# Patient Record
Sex: FEMALE | Race: WHITE | NOT HISPANIC OR LATINO | Employment: FULL TIME | URBAN - METROPOLITAN AREA
[De-identification: names, ages, dates, MRNs, and addresses within clinical notes are randomized per-mention and may not be internally consistent; named-entity substitution may affect disease eponyms.]

---

## 2017-04-27 ENCOUNTER — TELEPHONE (OUTPATIENT)
Dept: OBGYN | Facility: CLINIC | Age: 32
End: 2017-04-27

## 2017-04-27 NOTE — TELEPHONE ENCOUNTER
First pregnancy-estimates 4 weeks along-positive home pregnancy test. Sending encounter to clinic per protocol to establish new prenatal. Please contact at [phone]. Okay to leave detailed message.    Central Scheduling  Suly FAIRCHILD

## 2017-05-09 DIAGNOSIS — O36.80X0 ENCOUNTER TO DETERMINE FETAL VIABILITY OF PREGNANCY, NOT APPLICABLE OR UNSPECIFIED FETUS: Primary | ICD-10-CM

## 2017-05-18 ENCOUNTER — TRANSFERRED RECORDS (OUTPATIENT)
Dept: HEALTH INFORMATION MANAGEMENT | Facility: CLINIC | Age: 32
End: 2017-05-18

## 2017-05-18 ENCOUNTER — RADIANT APPOINTMENT (OUTPATIENT)
Dept: ULTRASOUND IMAGING | Facility: CLINIC | Age: 32
End: 2017-05-18
Payer: COMMERCIAL

## 2017-05-18 ENCOUNTER — PRENATAL OFFICE VISIT (OUTPATIENT)
Dept: OBGYN | Facility: CLINIC | Age: 32
End: 2017-05-18
Payer: COMMERCIAL

## 2017-05-18 VITALS
BODY MASS INDEX: 29.89 KG/M2 | SYSTOLIC BLOOD PRESSURE: 117 MMHG | HEART RATE: 90 BPM | WEIGHT: 197.2 LBS | DIASTOLIC BLOOD PRESSURE: 81 MMHG | HEIGHT: 68 IN

## 2017-05-18 DIAGNOSIS — O36.80X0 ENCOUNTER TO DETERMINE FETAL VIABILITY OF PREGNANCY, NOT APPLICABLE OR UNSPECIFIED FETUS: ICD-10-CM

## 2017-05-18 DIAGNOSIS — Z34.01 ENCOUNTER FOR SUPERVISION OF NORMAL FIRST PREGNANCY IN FIRST TRIMESTER: Primary | ICD-10-CM

## 2017-05-18 PROBLEM — Z34.00 SUPERVISION OF NORMAL IUP (INTRAUTERINE PREGNANCY) IN PRIMIGRAVIDA: Status: ACTIVE | Noted: 2017-05-18

## 2017-05-18 LAB
ABO + RH BLD: NORMAL
ABO + RH BLD: NORMAL
ALBUMIN UR-MCNC: NEGATIVE MG/DL
APPEARANCE UR: CLEAR
BACTERIA #/AREA URNS HPF: ABNORMAL /HPF
BASOPHILS # BLD AUTO: 0 10E9/L (ref 0–0.2)
BASOPHILS NFR BLD AUTO: 0.2 %
BILIRUB UR QL STRIP: NEGATIVE
BLD GP AB SCN SERPL QL: NORMAL
BLOOD BANK CMNT PATIENT-IMP: NORMAL
COLOR UR AUTO: YELLOW
DIFFERENTIAL METHOD BLD: NORMAL
EOSINOPHIL # BLD AUTO: 0.1 10E9/L (ref 0–0.7)
EOSINOPHIL NFR BLD AUTO: 1.7 %
ERYTHROCYTE [DISTWIDTH] IN BLOOD BY AUTOMATED COUNT: 11.1 % (ref 10–15)
GLUCOSE UR STRIP-MCNC: NEGATIVE MG/DL
HCT VFR BLD AUTO: 37.6 % (ref 35–47)
HGB BLD-MCNC: 13 G/DL (ref 11.7–15.7)
HGB UR QL STRIP: ABNORMAL
KETONES UR STRIP-MCNC: NEGATIVE MG/DL
LEUKOCYTE ESTERASE UR QL STRIP: ABNORMAL
LYMPHOCYTES # BLD AUTO: 1.9 10E9/L (ref 0.8–5.3)
LYMPHOCYTES NFR BLD AUTO: 22.3 %
MCH RBC QN AUTO: 31.5 PG (ref 26.5–33)
MCHC RBC AUTO-ENTMCNC: 34.6 G/DL (ref 31.5–36.5)
MCV RBC AUTO: 91 FL (ref 78–100)
MONOCYTES # BLD AUTO: 0.5 10E9/L (ref 0–1.3)
MONOCYTES NFR BLD AUTO: 6.3 %
NEUTROPHILS # BLD AUTO: 5.8 10E9/L (ref 1.6–8.3)
NEUTROPHILS NFR BLD AUTO: 69.5 %
NITRATE UR QL: NEGATIVE
NON-SQ EPI CELLS #/AREA URNS LPF: ABNORMAL /LPF
PH UR STRIP: 7 PH (ref 5–7)
PLATELET # BLD AUTO: 187 10E9/L (ref 150–450)
RBC # BLD AUTO: 4.13 10E12/L (ref 3.8–5.2)
RBC #/AREA URNS AUTO: ABNORMAL /HPF (ref 0–2)
RUBELLA ABY IGG: NORMAL
SP GR UR STRIP: 1.01 (ref 1–1.03)
SPECIMEN EXP DATE BLD: NORMAL
URN SPEC COLLECT METH UR: ABNORMAL
UROBILINOGEN UR STRIP-ACNC: 0.2 EU/DL (ref 0.2–1)
WBC # BLD AUTO: 8.3 10E9/L (ref 4–11)
WBC #/AREA URNS AUTO: ABNORMAL /HPF (ref 0–2)

## 2017-05-18 PROCEDURE — 86850 RBC ANTIBODY SCREEN: CPT | Performed by: OBSTETRICS & GYNECOLOGY

## 2017-05-18 PROCEDURE — 87491 CHLMYD TRACH DNA AMP PROBE: CPT | Performed by: OBSTETRICS & GYNECOLOGY

## 2017-05-18 PROCEDURE — 99207 ZZC FIRST OB VISIT: CPT | Performed by: OBSTETRICS & GYNECOLOGY

## 2017-05-18 PROCEDURE — 86900 BLOOD TYPING SEROLOGIC ABO: CPT | Performed by: OBSTETRICS & GYNECOLOGY

## 2017-05-18 PROCEDURE — 76801 OB US < 14 WKS SINGLE FETUS: CPT | Performed by: OBSTETRICS & GYNECOLOGY

## 2017-05-18 PROCEDURE — 87591 N.GONORRHOEAE DNA AMP PROB: CPT | Performed by: OBSTETRICS & GYNECOLOGY

## 2017-05-18 PROCEDURE — 86780 TREPONEMA PALLIDUM: CPT | Performed by: OBSTETRICS & GYNECOLOGY

## 2017-05-18 PROCEDURE — 87086 URINE CULTURE/COLONY COUNT: CPT | Performed by: OBSTETRICS & GYNECOLOGY

## 2017-05-18 PROCEDURE — 85025 COMPLETE CBC W/AUTO DIFF WBC: CPT | Performed by: OBSTETRICS & GYNECOLOGY

## 2017-05-18 PROCEDURE — 87389 HIV-1 AG W/HIV-1&-2 AB AG IA: CPT | Performed by: OBSTETRICS & GYNECOLOGY

## 2017-05-18 PROCEDURE — 40000791 ZZHCL STATISTIC VERIFI PRENATAL TRISOMY 21,18,13: Mod: 90 | Performed by: OBSTETRICS & GYNECOLOGY

## 2017-05-18 PROCEDURE — 81001 URINALYSIS AUTO W/SCOPE: CPT | Performed by: OBSTETRICS & GYNECOLOGY

## 2017-05-18 PROCEDURE — 87340 HEPATITIS B SURFACE AG IA: CPT | Performed by: OBSTETRICS & GYNECOLOGY

## 2017-05-18 PROCEDURE — 86762 RUBELLA ANTIBODY: CPT | Performed by: OBSTETRICS & GYNECOLOGY

## 2017-05-18 PROCEDURE — 36415 COLL VENOUS BLD VENIPUNCTURE: CPT | Performed by: OBSTETRICS & GYNECOLOGY

## 2017-05-18 PROCEDURE — 99000 SPECIMEN HANDLING OFFICE-LAB: CPT | Performed by: OBSTETRICS & GYNECOLOGY

## 2017-05-18 PROCEDURE — 86901 BLOOD TYPING SEROLOGIC RH(D): CPT | Performed by: OBSTETRICS & GYNECOLOGY

## 2017-05-18 ASSESSMENT — ANXIETY QUESTIONNAIRES
6. BECOMING EASILY ANNOYED OR IRRITABLE: SEVERAL DAYS
1. FEELING NERVOUS, ANXIOUS, OR ON EDGE: SEVERAL DAYS
3. WORRYING TOO MUCH ABOUT DIFFERENT THINGS: NOT AT ALL
5. BEING SO RESTLESS THAT IT IS HARD TO SIT STILL: NOT AT ALL
IF YOU CHECKED OFF ANY PROBLEMS ON THIS QUESTIONNAIRE, HOW DIFFICULT HAVE THESE PROBLEMS MADE IT FOR YOU TO DO YOUR WORK, TAKE CARE OF THINGS AT HOME, OR GET ALONG WITH OTHER PEOPLE: NOT DIFFICULT AT ALL
7. FEELING AFRAID AS IF SOMETHING AWFUL MIGHT HAPPEN: NOT AT ALL
2. NOT BEING ABLE TO STOP OR CONTROL WORRYING: NOT AT ALL
GAD7 TOTAL SCORE: 2

## 2017-05-18 ASSESSMENT — PATIENT HEALTH QUESTIONNAIRE - PHQ9: 5. POOR APPETITE OR OVEREATING: NOT AT ALL

## 2017-05-18 NOTE — PROGRESS NOTES
This is a 31 year old female patient,   who presents for her first obstetrical visit.    Patient's last menstrual period was 2017..  This gives her an EDC of Dec 14, 2017 .  She is 10w0d weeks.  Her cycles are regular.  Her last menstrual period was normal.  She has had an ultrasound on 17 which showed IUP @ 11w0d. .  Since her LMP, she has experienced  nausea, fatigue, headache, constipation and Hair, Breast tenderness, Diarrhea, Heartburn).  She denies emesis, abdominal pain, loss of appetite, vaginal discharge, dysuria, pelvic pain, urinary urgency, lightheadedness, urinary frequency, vaginal bleeding and hemorrhoids.    Was having 5-6 drinks per day up until 3wks ago. Several years like this. Has thought that she has a drinking problem. However found it easy to stop.   Was smoking about 1/4-1/2 ppd. Stopped about 3.5wks ago. MJ occ.   Also used plan B x 2 3/26 and 4/10 as pregnancy was unplanned.    Has a cat, Qasim. Questions about toxo.    Regular periods. No contraception. Stopped OCPs 5yrs ago, was having more migraines.     Depression/anxiety in past. Moods up and down.      No history abnormal paps. Last     No travel to tropical places in last year+        Past History:  Her past medical history   Past Medical History:   Diagnosis Date     Anxiety      Depression     Was on zoloft in past for 9mo. Self stopped     Excessive drinking alcohol     Monitor through pregnancy.      Migraine headache 9/3/2007    maxalt helps    .      This is her first pregnancy    Since her last LMP she denies use of alcohol, tobacco and street drugs.    HISTORY:  Obstetric History       T0      TAB0   SAB0   E0   M0   L0       # Outcome Date GA Lbr Saturnino/2nd Weight Sex Delivery Anes PTL Lv   1 Current                 Past Medical History:   Diagnosis Date     Anxiety      Depression     Was on zoloft in past for 9mo. Self stopped     Excessive drinking alcohol     Monitor through pregnancy.       Migraine headache 9/3/2007    maxalt helps      Past Surgical History:   Procedure Laterality Date     ENT SURGERY      wisdom teeth 2015     ORTHOPEDIC SURGERY      hip surgery as a baby, yuniel hip     Family History   Problem Relation Age of Onset     Cardiovascular Maternal Grandfather      Hypertension Maternal Grandfather      Lipids Maternal Grandfather      Cardiovascular Paternal Grandmother      Alcohol/Drug Paternal Grandmother      OSTEOPOROSIS Paternal Grandmother      DIABETES Mother      pre diabetic     Hypertension Mother      Allergies Mother      Gynecology Mother      DIABETES Maternal Grandmother      Hypertension Maternal Grandmother      Social History     Social History     Marital status: Single     Spouse name: N/A     Number of children: N/A     Years of education: N/A     Social History Main Topics     Smoking status: Former Smoker     Smokeless tobacco: Never Used     Alcohol use No      Comment: daily 1-2 glasses wine     Drug use: No     Sexual activity: Yes     Partners: Male     Birth control/ protection: Condom     Other Topics Concern     Parent/Sibling W/ Cabg, Mi Or Angioplasty Before 65f 55m? No     Social History Narrative     Current Outpatient Prescriptions   Medication Sig     rizatriptan (MAXALT) 10 MG tablet Take 1 tablet (10 mg) by mouth at onset of headache (Patient not taking: Reported on 5/18/2017)     melatonin 3 MG tablet Take 1 tablet by mouth nightly as needed Reported on 5/18/2017     Multiple Vitamin (MULTI-VITAMIN DAILY PO) Take 1 tablet by mouth daily Reported on 5/18/2017     calcium-vitamin D (CALTRATE 600+D) 600-400 MG-UNIT per tablet Take 1 tablet by mouth 2 times daily Reported on 5/18/2017     cetirizine (ZYRTEC ALLERGY) 10 MG tablet Take 1 tablet by mouth daily Reported on 5/18/2017     ibuprofen (ADVIL,MOTRIN) 800 MG tablet Take 1 tablet (800 mg) by mouth every 8 hours as needed for pain (Patient not taking: Reported on 5/18/2017)     No current  "facility-administered medications for this visit.      Allergies   Allergen Reactions     Nkda [No Known Drug Allergies]        Past medical, surgical, social and family history were reviewed and updated in EPIC.    ROS:   12 point review of systems negative other than symptoms noted below.  Constitutional: Fatigue  Breast: Tenderness  Gastrointestinal: Constipation, Diarrhea, Heartburn and Nausea  Neurologic: Headaches  Psychiatric: Hair    EXAM:  /81  Pulse 90  Ht 5' 8\" (1.727 m)  Wt 197 lb 3.2 oz (89.4 kg)  LMP 03/09/2017  BMI 29.98 kg/m2   BMI: Body mass index is 29.98 kg/(m^2).    EXAM:  Constitutional:  Appearance: Well nourished, well developed alert, in no acute distress.  Neck:   Lymph Nodes:  No lymphadenopathy present.    Thyroid:  Gland size normal, nontender, no nodules or masses present  on palpation.  Chest:  Respiratory Effort:  Breathing unlabored.  Cardiovascular:  Heart Auscultation:  Regular rate, normal rhythm, no murmurs    present.  Breasts: Deferred.    Axillary Lymph Nodes:  No lymphadenopathy present.  Gastrointestinal:  Abdominal Examination:  Abdomen nontender to palpation, tone  normal without rigidity or guarding, no masses present, umbilicus without  Lesions.    Liver and speen:  No hepatomegaly present, liver nontender to  palpation.    Hernias:  No hernias present.  Lymphatic: Lymph Nodes:  No other lymphadenopathy present.  Skin:  General Inspection:  No rashes present, no lesions present, no areas of  discoloration.    Genitalia and Groin:  No rashes present, no lesions present, no areas of  discoloration, no masses present.  Neurologic/Psychiatric:    Mental Status:  Oriented X3.    Pelvic Exam:  External Genitalia:     Normal appearance for age, no discharge present, no tenderness present, no inflammatory lesions present, color normal  Vagina:     Normal vaginal vault without central or paravaginal defects, no discharge present, no inflammatory lesions present, no " masses present  Bladder:     Nontender to palpation  Urethra:   Urethral Body:  Urethra palpation normal, urethra structural support normal   Urethral Meatus:  No erythema or lesions present  Cervix:     Appearance healthy, no lesions present, nontender to palpation, no bleeding present  Uterus:     Nontender to palpation, no masses present, position anteflexed, mobility: normal  Adnexa:     No adnexal tenderness present, no adnexal masses present  Perineum:     Perineum within normal limits, no evidence of trauma, no rashes or skin lesions present  Anus:     Anus within normal limits, no hemorrhoids present  Inguinal Lymph Nodes:     No lymphadenopathy present  Pubic Hair:     Normal pubic hair distribution for age  Genitalia and Groin:     No rashes present, no lesions present, no areas of discoloration, no masses present    ASSESSMENT:      ICD-10-CM    1. Encounter for supervision of normal first pregnancy in first trimester Z34.01 UA with Microscopic     ABO/Rh type and screen     Anti Treponema     CBC with platelets differential     Hepatitis B surface antigen     HIV Antigen Antibody Combo     Urine Culture Aerobic Bacterial     Rubella Antibody IgG Quantitative     Chlamydia trachomatis PCR     Neisseria gonorrhoeae PCR     Prenatal trisomy 21,18,13       PLAN/PATIENT INSTRUCTIONS:      -UTD (2016) cervical cancer screening.  -NOB labs today, orders reviewed  -Desires aneuploidy screening. Discussed options for diagnostic and screening tests, benefits and limitations of each.   -Discussed risks of drinking/smoking/meds use such as plan B in early parts of pregnancy. Did stop these 2.5-3.5wks ago which is potentially beneficial.   Discussed how Plan B works to prevent ovulation.  US shows appropriately grown, viable IUP. WIll cont to monitor.   -Discussed her drinking, has stopped. Will monitor. May need to address as pregnancy goes on, potential need for intervention .  -monitor anxiety.  -reviewed  migraines in preg, OTC therapies.  -miscarriage precautions reviewed            Stephanie Mckenzies, DO

## 2017-05-18 NOTE — NURSING NOTE
Obstetrical Risk History    Patient presents for new OB labs and teaching.      1. Please indicate any condition you have or have had in the past:  Migraine  2. Do you smoke?  No  If yes, how many packs/day?   3. Do you drink alcoholic beverages? No  If yes, how often?  What type?   4. List any medications taken since your last period: None  5. List any recreational drugs (cocaine, marijuana, etc. used since your last period:None    6. List any chemical or radiation exposure that you've encountered: None  7. Are you on a restricted diet? No  If yes, please describe:  Do you have any Quaker objections to any form of treatment? No    GENETIC SCREENING    These questions apply to you, the baby's father, or anyone in either family with:    1. Patient's age 35 or greater at delivery? No  2. Wolof, Danish, Mediterranean ancestry? Yes, FOB maternal side Wolof  3. Neural Tube Defect (Meningomyelocele, Spina Bifida, or Anencephaly)? No  4. Church, Maltese Reeves or history of Bulmaro-Sachs disease? No  5. Down's Syndrome?   No  6. Hemophilia or clotting disorder? No  7. Muscular Dystrophy? No  8. Cystic Fibrosis? No  9. Tamra's Chorea? No  10. Mental Retardation? No  11. 3 or more miscarriages or a stillborn? No  12. Other inherited disease or chromosomal disorder? No  13. Have you or the baby's father had a child born with a birth defect? No  14. Did you or the baby's father have a birth defect yourselves? Yes Pt born with congential Hip    Do you have any other concerns about birth defects? Yes Pt is concerned with damage from drinking and smoking and plan B for first 6 weeks

## 2017-05-18 NOTE — MR AVS SNAPSHOT
After Visit Summary   5/18/2017    Maddy Anglin    MRN: [id number]           Patient Information     Date Of Birth          1985        Visit Information        Provider Department      5/18/2017 10:40 AM Stephanie Hanna DO; [facility] [place]        Today's Diagnoses     Encounter for supervision of normal first pregnancy in first trimester    -  1       Follow-ups after your visit        Your next 10 appointments already scheduled     Jun 19, 2017  3:20 PM CDT   ESTABLISHED PRENATAL with Stephanie Hanna DO   [facility] [place] ([facility])    [address] [place] [address] [address]  [facility] [id number] [phone]              Who to contact     If you have questions or need follow up information about today's clinic visit or your schedule please contact [facility] directly at [phone].  Normal or non-critical lab and imaging results will be communicated to you by MyChart, letter or phone within 4 business days after the clinic has received the results. If you do not hear from us within 7 days, please contact the clinic through Feedskyhart or phone. If you have a critical or abnormal lab result, we will notify you by phone as soon as possible.  Submit refill requests through Crush on original products or call your pharmacy and they will forward the refill request to us. Please allow 3 business days for your refill to be completed.          Additional Information About Your Visit        MyChart Information     Crush on original products gives you secure access to your electronic health record. If you see a primary care provider, you can also send messages to your care team and make appointments. If you have questions, please call your primary care clinic.  If you do not have a primary care provider, please call [phone] and they will assist you.        Care EveryWhere ID     This is your Care EveryWhere ID. This could be used by other  "organizations to access your [place] medical records  [id number]        Your Vitals Were     Pulse Height Last Period BMI (Body Mass Index)          90 5' 8\" (1.727 m) 03/09/2017 29.98 kg/m2         Blood Pressure from Last 3 Encounters:   05/18/17 117/81   11/07/16 100/70   10/18/16 120/78    Weight from Last 3 Encounters:   05/18/17 197 lb 3.2 oz (89.4 kg)   11/07/16 193 lb (87.5 kg)   10/18/16 193 lb (87.5 kg)              We Performed the Following     ABO/Rh type and screen     Anti Treponema     CBC with platelets differential     Chlamydia trachomatis PCR     Hepatitis B surface antigen     HIV Antigen Antibody Combo     Neisseria gonorrhoeae PCR     Prenatal trisomy 21,18,13     Rubella Antibody IgG Quantitative     UA with Microscopic     Urine Culture Aerobic Bacterial        Primary Care Provider Office Phone # Fax #    Luna Jiang [phone]       [facility] [address] [place] [address]        Thank you!     Thank you for choosing [facility] [place]  for your care. Our goal is always to provide you with excellent care. Hearing back from our patients is one way we can continue to improve our services. Please take a few minutes to complete the written survey that you may receive in the mail after your visit with us. Thank you!             Your Updated Medication List - Protect others around you: Learn how to safely use, store and throw away your medicines at www.disposemymeds.org.          This list is accurate as of: 5/18/17 12:08 PM.  Always use your most recent med list.                   Brand Name Dispense Instructions for use    CALTRATE 600+D 600-400 MG-UNIT per tablet   Generic drug:  calcium-vitamin D      Take 1 tablet by mouth 2 times daily Reported on 5/18/2017       ibuprofen 800 MG tablet    ADVIL/MOTRIN    30 tablet    Take 1 tablet (800 mg) by mouth every 8 hours as needed for pain       melatonin 3 MG tablet      Take 1 " tablet by mouth nightly as needed Reported on 5/18/2017       MULTI-VITAMIN DAILY PO      Take 1 tablet by mouth daily Reported on 5/18/2017       rizatriptan 10 MG tablet    MAXALT    20 tablet    Take 1 tablet (10 mg) by mouth at onset of headache       ZYRTEC ALLERGY 10 MG tablet   Generic drug:  cetirizine      Take 1 tablet by mouth daily Reported on 5/18/2017

## 2017-05-19 LAB
BACTERIA SPEC CULT: NORMAL
C TRACH DNA SPEC QL NAA+PROBE: NORMAL
HBV SURFACE AG SERPL QL IA: NONREACTIVE
HIV 1+2 AB+HIV1 P24 AG SERPL QL IA: NORMAL
Lab: NORMAL
MICRO REPORT STATUS: NORMAL
N GONORRHOEA DNA SPEC QL NAA+PROBE: NORMAL
RUBV IGG SERPL IA-ACNC: 31 IU/ML
SPECIMEN SOURCE: NORMAL
T PALLIDUM IGG+IGM SER QL: NEGATIVE

## 2017-05-19 ASSESSMENT — ANXIETY QUESTIONNAIRES: GAD7 TOTAL SCORE: 2

## 2017-05-19 ASSESSMENT — PATIENT HEALTH QUESTIONNAIRE - PHQ9: SUM OF ALL RESPONSES TO PHQ QUESTIONS 1-9: 5

## 2017-05-26 ENCOUNTER — TELEPHONE (OUTPATIENT)
Dept: NURSING | Facility: CLINIC | Age: 32
End: 2017-05-26

## 2017-05-26 NOTE — TELEPHONE ENCOUNTER
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 Pt did want to know the gender. Informed a boy.

## 2017-06-23 ENCOUNTER — PRENATAL OFFICE VISIT (OUTPATIENT)
Dept: OBGYN | Facility: CLINIC | Age: 32
End: 2017-06-23
Payer: COMMERCIAL

## 2017-06-23 VITALS — DIASTOLIC BLOOD PRESSURE: 70 MMHG | BODY MASS INDEX: 30.11 KG/M2 | WEIGHT: 198 LBS | SYSTOLIC BLOOD PRESSURE: 112 MMHG

## 2017-06-23 DIAGNOSIS — Z34.02 ENCOUNTER FOR SUPERVISION OF NORMAL FIRST PREGNANCY IN SECOND TRIMESTER: ICD-10-CM

## 2017-06-23 PROCEDURE — 99207 ZZC PRENATAL VISIT: CPT | Performed by: OBSTETRICS & GYNECOLOGY

## 2017-06-23 NOTE — MR AVS SNAPSHOT
After Visit Summary   6/23/2017    Maddy Anglin    MRN: [id number]           Patient Information     Date Of Birth          1985        Visit Information        Provider Department      6/23/2017 2:10 PM Stephanie Hanna,  [facility] [place]        Today's Diagnoses     Encounter for supervision of normal first pregnancy in second trimester           Follow-ups after your visit        Follow-up notes from your care team     Return in about 4 weeks (around 7/21/2017).      Your next 10 appointments already scheduled     Jul 24, 2017  3:00 PM CDT   US OB > 14 WEEKS COMPLETE SINGLE with WEUS1   [facility] [place] ([facility] [place])    [address]  [place] [address] [phone]           Please bring a list of your medicines (including vitamins, minerals and over-the-counter drugs). Also, tell your doctor about any allergies you may have. Wear comfortable clothes and leave your valuables at home.  If you re less than 20 weeks drink four 8-ounce glasses of fluid an hour before your exam. If you need to empty your bladder before your exam, try to release only a little urine. Then, drink another glass of fluid.  You may have up to two family members in the exam room. If you bring a small child, an adult must be there to care for him or her.  Please call the Imaging Department at your exam site with any questions.            Jul 24, 2017  3:50 PM CDT   ESTABLISHED PRENATAL with Stephanie Hanna,    [facility] [place] ([facility] [place])    [address]  [facility] [address] [phone]              Future tests that were ordered for you today     Open Future Orders        Priority Expected Expires Ordered    US OB > 14 Weeks Complete Single Routine  6/24/2018 6/23/2017            Who to contact     If you have questions or need follow up information about today's  clinic visit or your schedule please contact [facility] [place] directly at [phone].  Normal or non-critical lab and imaging results will be communicated to you by MyChart, letter or phone within 4 business days after the clinic has received the results. If you do not hear from us within 7 days, please contact the clinic through Heaphart or phone. If you have a critical or abnormal lab result, we will notify you by phone as soon as possible.  Submit refill requests through Cloze or call your pharmacy and they will forward the refill request to us. Please allow 3 business days for your refill to be completed.          Additional Information About Your Visit        HeapharY-Clients Information     Cloze gives you secure access to your electronic health record. If you see a primary care provider, you can also send messages to your care team and make appointments. If you have questions, please call your primary care clinic.  If you do not have a primary care provider, please call [phone] and they will assist you.        Care EveryWhere ID     This is your Care EveryWhere ID. This could be used by other organizations to access your [place] medical records  [id number]        Your Vitals Were     Last Period BMI (Body Mass Index)                03/09/2017 30.11 kg/m2           Blood Pressure from Last 3 Encounters:   06/23/17 112/70   05/18/17 117/81   11/07/16 100/70    Weight from Last 3 Encounters:   06/23/17 198 lb (89.8 kg)   05/18/17 197 lb 3.2 oz (89.4 kg)   11/07/16 193 lb (87.5 kg)               Primary Care Provider Office Phone # Fax #    Luna Jiang [phone]       [facility] [address]   [facility] [address]        Equal Access to Services     PATEL FALLON : Trey Alcocer, samson maldonado, taty fitzgerald, bhavin truong. So [facility] [phone].    ATENCIÓN: Si gregorio forrester, sheri read corado  disposición servicios gratuitos de asistencia lingüística. Harriet paiz [phone].    We comply with applicable federal civil rights laws and [place] laws. We do not discriminate on the basis of race, color, national origin, age, disability sex, sexual orientation or gender identity.            Thank you!     Thank you for choosing [facility] [place]  for your care. Our goal is always to provide you with excellent care. Hearing back from our patients is one way we can continue to improve our services. Please take a few minutes to complete the written survey that you may receive in the mail after your visit with us. Thank you!             Your Updated Medication List - Protect others around you: Learn how to safely use, store and throw away your medicines at www.disposemymeds.org.          This list is accurate as of: 6/23/17  2:53 PM.  Always use your most recent med list.                   Brand Name Dispense Instructions for use Diagnosis    PRENATAL VITAMIN PO       Encounter for supervision of normal first pregnancy in second trimester

## 2017-06-23 NOTE — PROGRESS NOTES
No loss of fluid/vaginal bleeding/regular contractions. No FM  Occ HA's.   -Innatal wnl. BOY!  - Labor precautions. F/U 4wk.    Stephanie Banerjee Masters, DO

## 2017-07-12 LAB — NON INVASIVE PRENATAL TEST CELL FREE DNA: NORMAL

## 2017-07-28 ENCOUNTER — RADIANT APPOINTMENT (OUTPATIENT)
Dept: ULTRASOUND IMAGING | Facility: CLINIC | Age: 32
End: 2017-07-28
Attending: OBSTETRICS & GYNECOLOGY
Payer: COMMERCIAL

## 2017-07-28 ENCOUNTER — PRENATAL OFFICE VISIT (OUTPATIENT)
Dept: OBGYN | Facility: CLINIC | Age: 32
End: 2017-07-28
Attending: OBSTETRICS & GYNECOLOGY
Payer: COMMERCIAL

## 2017-07-28 VITALS — SYSTOLIC BLOOD PRESSURE: 119 MMHG | BODY MASS INDEX: 30.56 KG/M2 | WEIGHT: 201 LBS | DIASTOLIC BLOOD PRESSURE: 70 MMHG

## 2017-07-28 DIAGNOSIS — Z3A.20 20 WEEKS GESTATION OF PREGNANCY: ICD-10-CM

## 2017-07-28 DIAGNOSIS — Z34.02 ENCOUNTER FOR SUPERVISION OF NORMAL FIRST PREGNANCY IN SECOND TRIMESTER: ICD-10-CM

## 2017-07-28 DIAGNOSIS — O35.EXX0 PYELECTASIS OF FETUS ON PRENATAL ULTRASOUND: Primary | ICD-10-CM

## 2017-07-28 PROCEDURE — 99207 ZZC PRENATAL VISIT: CPT | Performed by: OBSTETRICS & GYNECOLOGY

## 2017-07-28 PROCEDURE — 76805 OB US >/= 14 WKS SNGL FETUS: CPT | Performed by: OBSTETRICS & GYNECOLOGY

## 2017-07-28 NOTE — PROGRESS NOTES
No loss of fluid/vaginal bleeding/regular contractions. No FM  -Reviewed US. B/L pyelectasis. Innatal negative. Suspect will resolve/not be clinically significant and not likely associated with aneuploidy. Repeat US at 28wk  - Labor precautions. F/U 4wk.     Stephanie Banerjee Masters, DO

## 2017-07-28 NOTE — MR AVS SNAPSHOT
After Visit Summary   7/28/2017    Maddy Anglin    MRN: [id number]           Patient Information     Date Of Birth          1985        Visit Information        Provider Department      7/28/2017 3:00 PM Stephanie Hanna DO [facility]        Today's Diagnoses     Pyelectasis of fetus on prenatal ultrasound    -  1    20 weeks gestation of pregnancy           Follow-ups after your visit        Your next 10 appointments already scheduled     Aug 25, 2017  2:00 PM CDT   ESTABLISHED PRENATAL with Stephanie Hanna DO   [facility] ([facility]    [address] [place] [id number] [phone]              Who to contact     If you have questions or need follow up information about today's clinic visit or your schedule please contact [facility] directly at [phone].  Normal or non-critical lab and imaging results will be communicated to you by Xtiumhart, letter or phone within 4 business days after the clinic has received the results. If you do not hear from us within 7 days, please contact the clinic through Xtiumhart or phone. If you have a critical or abnormal lab result, we will notify you by phone as soon as possible.  Submit refill requests through Web Geo Services or call your pharmacy and they will forward the refill request to us. Please allow 3 business days for your refill to be completed.          Additional Information About Your Visit        MyChart Information     Web Geo Services gives you secure access to your electronic health record. If you see a primary care provider, you can also send messages to your care team and make appointments. If you have questions, please call your primary care clinic.  If you do not have a primary care provider, please call [phone] and they will assist you.        Care EveryWhere ID     This is your Care EveryWhere ID. This could be used by other  organizations to access your [place] medical records  [id number]        Your Vitals Were     Last Period BMI (Body Mass Index)                03/09/2017 30.56 kg/m2           Blood Pressure from Last 3 Encounters:   07/28/17 119/70   06/23/17 112/70   05/18/17 117/81    Weight from Last 3 Encounters:   07/28/17 201 lb (91.2 kg)   06/23/17 198 lb (89.8 kg)   05/18/17 197 lb 3.2 oz (89.4 kg)              Today, you had the following     No orders found for display       Primary Care Provider Office Phone # Fax #    Luna Jiang [phone]       [facility] [address] [address]        Equal Access to Services     PATEL [place] : Hadii aad ku hadasho Soana, waaxda luqadaha, qaybta kaalmada adeegyada, bhavin reese . So [facility] [phone].    ATENCIÓN: Si habla español, tiene a corado disposición servicios gratuitos de asistencia lingüística. Llame al [phone].    We comply with applicable federal civil rights laws and [place] laws. We do not discriminate on the basis of race, color, national origin, age, disability sex, sexual orientation or gender identity.            Thank you!     Thank you for choosing [facility] [place]  for your care. Our goal is always to provide you with excellent care. Hearing back from our patients is one way we can continue to improve our services. Please take a few minutes to complete the written survey that you may receive in the mail after your visit with us. Thank you!             Your Updated Medication List - Protect others around you: Learn how to safely use, store and throw away your medicines at www.disposemymeds.org.          This list is accurate as of: 7/28/17  3:29 PM.  Always use your most recent med list.                   Brand Name Dispense Instructions for use Diagnosis    PRENATAL VITAMIN PO       Encounter for supervision of normal first pregnancy in second trimester

## 2017-08-25 ENCOUNTER — PRENATAL OFFICE VISIT (OUTPATIENT)
Dept: OBGYN | Facility: CLINIC | Age: 32
End: 2017-08-25
Payer: COMMERCIAL

## 2017-08-25 VITALS — WEIGHT: 204.8 LBS | BODY MASS INDEX: 31.14 KG/M2 | DIASTOLIC BLOOD PRESSURE: 70 MMHG | SYSTOLIC BLOOD PRESSURE: 114 MMHG

## 2017-08-25 DIAGNOSIS — Z34.02 ENCOUNTER FOR SUPERVISION OF NORMAL FIRST PREGNANCY IN SECOND TRIMESTER: ICD-10-CM

## 2017-08-25 PROCEDURE — 99207 ZZC PRENATAL VISIT: CPT | Performed by: OBSTETRICS & GYNECOLOGY

## 2017-08-25 NOTE — PROGRESS NOTES
No loss of fluid/vaginal bleeding/regular contractions. + FM  -Pyelectasis, 5mm b/l. Repeat at 32wk  - Labor precautions. F/U 4wk    Stephanie Banerjee Masters, DO

## 2017-08-25 NOTE — MR AVS SNAPSHOT
After Visit Summary   8/25/2017    Maddy Anglin    MRN: [id number]           Patient Information     Date Of Birth          1985        Visit Information        Provider Department      8/25/2017 2:00 PM Stephanie Hanna DO [facility] [place]        Today's Diagnoses     Encounter for supervision of normal first pregnancy in second trimester           Follow-ups after your visit        Follow-up notes from your care team     Return in about 4 weeks (around 9/22/2017).      Your next 10 appointments already scheduled     Sep 22, 2017  2:30 PM CDT   Glucose Tolerance Test with WE LAB   [facility] [place] ([facility] [place])    [address]  [address]  [place] [address]   [phone]            Sep 22, 2017  2:40 PM CDT   ESTABLISHED PRENATAL with Stephanie Hanna DO   [facility] [place] ([facility] [place])    [address]  [address]  [place] [address]   [phone]              Who to contact     If you have questions or need follow up information about today's clinic visit or your schedule please contact [facility] [place] directly at [phone].  Normal or non-critical lab and imaging results will be communicated to you by MyChart, letter or phone within 4 business days after the clinic has received the results. If you do not hear from us within 7 days, please contact the clinic through MyChart or phone. If you have a critical or abnormal lab result, we will notify you by phone as soon as possible.  Submit refill requests through OpDemand or call your pharmacy and they will forward the refill request to us. Please allow 3 business days for your refill to be completed.          Additional Information About Your Visit        MyChart Information     OpDemand gives you secure access to your electronic health record. If you see a primary care provider, you can also send messages  to your care team and make appointments. If you have questions, please call your primary care clinic.  If you do not have a primary care provider, please call [phone] and they will assist you.        Care EveryWhere ID     This is your Care EveryWhere ID. This could be used by other organizations to access your [place] medical records  [id number]        Your Vitals Were     Last Period BMI (Body Mass Index)                03/09/2017 31.14 kg/m2           Blood Pressure from Last 3 Encounters:   08/25/17 114/70   07/28/17 119/70   06/23/17 112/70    Weight from Last 3 Encounters:   08/25/17 204 lb 12.8 oz (92.9 kg)   07/28/17 201 lb (91.2 kg)   06/23/17 198 lb (89.8 kg)              Today, you had the following     No orders found for display       Primary Care Provider Office Phone # Fax #    Luna Jiang [phone]       [address] [address]        Equal Access to Services     [facility]: Hadii aad ku hadasho Soomaali, waaxda luqadaha, qaybta kaalmada adeegyada, bhavin cid hayadalgisa reese . So [facility] [phone].    ATENCIÓN: Si habla español, tiene a corado disposición servicios gratuitos de asistencia lingüística. Llame al [phone].    We comply with applicable federal civil rights laws and [place] laws. We do not discriminate on the basis of race, color, national origin, age, disability sex, sexual orientation or gender identity.            Thank you!     Thank you for choosing [facility] [place]  for your care. Our goal is always to provide you with excellent care. Hearing back from our patients is one way we can continue to improve our services. Please take a few minutes to complete the written survey that you may receive in the mail after your visit with us. Thank you!             Your Updated Medication List - Protect others around you: Learn how to safely use, store and throw away your medicines at www.disposemymeds.org.           This list is accurate as of: 8/25/17  2:37 PM.  Always use your most recent med list.                   Brand Name Dispense Instructions for use Diagnosis    PRENATAL VITAMIN PO       Encounter for supervision of normal first pregnancy in second trimester

## 2017-09-22 ENCOUNTER — PRENATAL OFFICE VISIT (OUTPATIENT)
Dept: OBGYN | Facility: CLINIC | Age: 32
End: 2017-09-22
Payer: COMMERCIAL

## 2017-09-22 VITALS — WEIGHT: 211 LBS | BODY MASS INDEX: 32.08 KG/M2 | DIASTOLIC BLOOD PRESSURE: 76 MMHG | SYSTOLIC BLOOD PRESSURE: 122 MMHG

## 2017-09-22 DIAGNOSIS — Z29.13 NEED FOR RHOGAM DUE TO RH NEGATIVE MOTHER: Primary | ICD-10-CM

## 2017-09-22 DIAGNOSIS — Z29.13 NEED FOR RHOGAM DUE TO RH NEGATIVE MOTHER: ICD-10-CM

## 2017-09-22 DIAGNOSIS — Z23 NEED FOR TDAP VACCINATION: ICD-10-CM

## 2017-09-22 DIAGNOSIS — Z34.03 ENCOUNTER FOR SUPERVISION OF NORMAL FIRST PREGNANCY IN THIRD TRIMESTER: ICD-10-CM

## 2017-09-22 DIAGNOSIS — Z34.03 ENCOUNTER FOR SUPERVISION OF NORMAL FIRST PREGNANCY IN THIRD TRIMESTER: Primary | ICD-10-CM

## 2017-09-22 LAB
BLD GP AB SCN SERPL QL: NORMAL
GLUCOSE 1H P 50 G GLC PO SERPL-MCNC: 118 MG/DL (ref 60–129)
HGB BLD-MCNC: 11.5 G/DL (ref 11.7–15.7)

## 2017-09-22 PROCEDURE — 86850 RBC ANTIBODY SCREEN: CPT | Performed by: OBSTETRICS & GYNECOLOGY

## 2017-09-22 PROCEDURE — 90715 TDAP VACCINE 7 YRS/> IM: CPT

## 2017-09-22 PROCEDURE — 96372 THER/PROPH/DIAG INJ SC/IM: CPT

## 2017-09-22 PROCEDURE — 36415 COLL VENOUS BLD VENIPUNCTURE: CPT | Performed by: OBSTETRICS & GYNECOLOGY

## 2017-09-22 PROCEDURE — 00000218 ZZHCL STATISTIC OBHBG - HEMOGLOBIN: Performed by: OBSTETRICS & GYNECOLOGY

## 2017-09-22 PROCEDURE — 90471 IMMUNIZATION ADMIN: CPT

## 2017-09-22 PROCEDURE — 82950 GLUCOSE TEST: CPT | Performed by: OBSTETRICS & GYNECOLOGY

## 2017-09-22 PROCEDURE — 99207 ZZC PRENATAL VISIT: CPT | Performed by: OBSTETRICS & GYNECOLOGY

## 2017-09-22 NOTE — PROGRESS NOTES
Syphilis is a sexually transmitted disease that can cause birth defects in the babies of untreated mothers. Every pregnant patient is tested for syphilis early in each pregnancy as part of the routine lab work. The [place] [facility] of [facility] has seen an increase in the rate of syphilis in [place]. The [facility] now recommends testing for syphilis 3 times during a pregnancy, the new prenatal visit, 28 weeks and when admitted for delivery. Patient declines lab testing for syphilis.

## 2017-09-22 NOTE — MR AVS SNAPSHOT
After Visit Summary   9/22/2017    Maddy Anglin    MRN: [id number]           Patient Information     Date Of Birth          1985        Visit Information        Provider Department      9/22/2017 2:30 PM WE LAB [facility] [place]        Today's Diagnoses     Encounter for supervision of normal first pregnancy in third trimester    -  1    Need for Tdap vaccination        Need for rhogam due to Rh negative mother           Follow-ups after your visit        Your next 10 appointments already scheduled     Oct 09, 2017  2:00 PM CDT   ESTABLISHED PRENATAL with Stephanie Banerjee Masters, DO   [facility] [place] ([facility] [place])    [address] [id number] [phone]              Who to contact     If you have questions or need follow up information about today's clinic visit or your schedule please contact [facility] [place] directly at [phone].  Normal or non-critical lab and imaging results will be communicated to you by Siinehart, letter or phone within 4 business days after the clinic has received the results. If you do not hear from us within 7 days, please contact the clinic through Siinehart or phone. If you have a critical or abnormal lab result, we will notify you by phone as soon as possible.  Submit refill requests through Sonian or call your pharmacy and they will forward the refill request to us. Please allow 3 business days for your refill to be completed.          Additional Information About Your Visit        MyChart Information     Sonian gives you secure access to your electronic health record. If you see a primary care provider, you can also send messages to your care team and make appointments. If you have questions, please call your primary care clinic.  If you do not have a primary care provider, please call [phone] and they will assist you.        Care EveryWhere ID     This is your  Care EveryWhere ID. This could be used by other organizations to access your [place] medical records  [id number]        Your Vitals Were     Last Period                   03/09/2017            Blood Pressure from Last 3 Encounters:   09/22/17 122/76   08/25/17 114/70   07/28/17 119/70    Weight from Last 3 Encounters:   09/22/17 211 lb (95.7 kg)   08/25/17 204 lb 12.8 oz (92.9 kg)   07/28/17 201 lb (91.2 kg)              We Performed the Following     Antibody screen red cell     Glucose tolerance gest screen 1 hour     OB hemoglobin        Primary Care Provider Office Phone # Fax #    Luna Jiang [phone] [address] [address]        Equal Access to Services     PATEL [place] : Hadii aad ku hadasho Soana, waaxda luqadaha, qaybta kaalmada adeegyada, bhavin reese . So [facility] [phone].    ATENCIÓN: Si habla español, tiene a corado disposición servicios gratuitos de asistencia lingüística. Llame al [phone].    We comply with applicable federal civil rights laws and [place] laws. We do not discriminate on the basis of race, color, national origin, age, disability sex, sexual orientation or gender identity.            Thank you!     [facility] [place]  [facility] your [facility]. Our goal is always to provide you with excellent care. Hearing back from our patients is one way we can continue to improve our services. Please take a few minutes to complete the written survey that you may receive in the mail after your visit with us. Thank you!             Your Updated Medication List - Protect others around you: Learn how to safely use, store and throw away your medicines at www.disposemymeds.org.          This list is accurate as of: 9/22/17  3:39 PM.  Always use your most recent med list.                   Brand Name Dispense Instructions for use Diagnosis    PRENATAL VITAMIN PO       Encounter for supervision  of normal first pregnancy in second trimester

## 2017-09-22 NOTE — MR AVS SNAPSHOT
After Visit Summary   9/22/2017    Maddy Anglin    MRN: [id number]           Patient Information     Date Of Birth          1985        Visit Information        Provider Department      9/22/2017 2:40 PM sStephanie DO [facility] [place]        Today's Diagnoses     Need for rhogam due to Rh negative mother    -  1    Encounter for supervision of normal first pregnancy in third trimester           Follow-ups after your visit        Follow-up notes from your care team     Return in about 2 weeks (around 10/6/2017).      Your next 10 appointments already scheduled     Oct 09, 2017  2:00 PM CDT   ESTABLISHED PRENATAL with Stephanie Hanna DO   [facility] [place] [facility] [place]    [address] [place] [address] [address]  [facility] [id number] [phone]              Who to contact     If you have questions or need follow up information about today's clinic visit or your schedule please contact [facility] [place] directly at [phone].  Normal or non-critical lab and imaging results will be communicated to you by Whoishart, letter or phone within 4 business days after the clinic has received the results. If you do not hear from us within 7 days, please contact the clinic through VeraLight or phone. If you have a critical or abnormal lab result, we will notify you by phone as soon as possible.  Submit refill requests through VeraLight or call your pharmacy and they will forward the refill request to us. Please allow 3 business days for your refill to be completed.          Additional Information About Your Visit        Whoishart Information     VeraLight gives you secure access to your electronic health record. If you see a primary care provider, you can also send messages to your care team and make appointments. If you have questions, please call your primary care clinic.  If you do not have a primary care provider, please call  [phone] and they will assist you.        Care EveryWhere ID     This is your Care EveryWhere ID. This could be used by other organizations to access your [place] medical records  [id number]        Your Vitals Were     Last Period BMI (Body Mass Index)                03/09/2017 32.08 kg/m2           Blood Pressure from Last 3 Encounters:   09/22/17 122/76   08/25/17 114/70   07/28/17 119/70    Weight from Last 3 Encounters:   09/22/17 211 lb (95.7 kg)   08/25/17 204 lb 12.8 oz (92.9 kg)   07/28/17 201 lb (91.2 kg)              Today, you had the following     No orders found for display       Primary Care Provider Office Phone # Fax #    Luna Jaing [phone] [address] [facility] [place] [address]        Equal Access to Services     [facility] : Hadii aad ku hadasho Soomaali, waaxda luqadaha, qaybta kaalmada adeegyada, waxay lovein hayfayen stalin reese . So [facility] [phone].    ATENCIÓN: Si habla español, tiene a corado disposición servicios gratuitos de asistencia lingüística. Harriet al [phone].    We comply with applicable federal civil rights laws and [place] laws. We do not discriminate on the basis of race, color, national origin, age, disability sex, sexual orientation or gender identity.            Thank you!     Thank you for choosing [facility] [place]  for your care. Our goal is always to provide you with excellent care. Hearing back from our patients is one way we can continue to improve our services. Please take a few minutes to complete the written survey that you may receive in the mail after your visit with us. Thank you!             Your Updated Medication List - Protect others around you: Learn how to safely use, store and throw away your medicines at www.disposemymeds.org.          This list is accurate as of: 9/22/17 11:59 PM.  Always use your most recent med list.                   Brand Name Dispense Instructions for use  Diagnosis    PRENATAL VITAMIN PO       Encounter for supervision of normal first pregnancy in second trimester

## 2017-09-22 NOTE — NURSING NOTE
Screening Questionnaire for Adult Immunization    Are you sick today?   No   Do you have allergies to medications, food, a vaccine component or latex?   No   Have you ever had a serious reaction after receiving a vaccination?   No   Do you have a long-term health problem with heart disease, lung disease, asthma, kidney disease, metabolic disease (e.g. diabetes), anemia, or other blood disorder?   No   Do you have cancer, leukemia, HIV/AIDS, or any other immune system problem?   No   In the past 3 months, have you taken medications that affect  your immune system, such as prednisone, other steroids, or anticancer drugs; drugs for the treatment of rheumatoid arthritis, Crohn s disease, or psoriasis; or have you had radiation treatments?   No   Have you had a seizure, or a brain or other nervous system problem?   No   During the past year, have you received a transfusion of blood or blood     products, or been given immune (gamma) globulin or antiviral drug?   No   For women: Are you pregnant or is there a chance you could become        pregnant during the next month?   Yes   Have you received any vaccinations in the past 4 weeks?   No     Immunization questionnaire answers were all negative.        Per orders of Dr. Hanna, injection of Tdap/Rhogam given by Alexsandra Can. Patient instructed to remain in clinic for 15 minutes afterwards, and to report any adverse reaction to me immediately.       Screening performed by Alexsandra Can on 9/22/2017 at 3:39 PM.

## 2017-09-22 NOTE — PROGRESS NOTES
No loss of fluid/vaginal bleeding/regular contractions. + FM  Has had migraines in past on OCPs so this is why stopped. Not sure what to use next.   -28wk labs. Tdap offered.   -Rh neg, rhogam today  - Labor precautions. F/U 2wk.     Stephanie Banerjee Masters, DO

## 2017-10-09 ENCOUNTER — PRENATAL OFFICE VISIT (OUTPATIENT)
Dept: OBGYN | Facility: CLINIC | Age: 32
End: 2017-10-09
Payer: COMMERCIAL

## 2017-10-09 VITALS — WEIGHT: 211.8 LBS | DIASTOLIC BLOOD PRESSURE: 73 MMHG | BODY MASS INDEX: 32.2 KG/M2 | SYSTOLIC BLOOD PRESSURE: 117 MMHG

## 2017-10-09 DIAGNOSIS — Z3A.30 30 WEEKS GESTATION OF PREGNANCY: ICD-10-CM

## 2017-10-09 DIAGNOSIS — Z34.03 ENCOUNTER FOR SUPERVISION OF NORMAL FIRST PREGNANCY IN THIRD TRIMESTER: ICD-10-CM

## 2017-10-09 DIAGNOSIS — O35.EXX0 PYELECTASIS OF FETUS ON PRENATAL ULTRASOUND: Primary | ICD-10-CM

## 2017-10-09 PROCEDURE — 99207 ZZC PRENATAL VISIT: CPT | Performed by: OBSTETRICS & GYNECOLOGY

## 2017-10-09 NOTE — MR AVS SNAPSHOT
After Visit Summary   10/9/2017    Maddy Anglin    MRN: [id number]           Patient Information     Date Of Birth          1985        Visit Information        Provider Department      10/9/2017 2:00 PM Stephanie Hanna,  [facility] [place]        Today's Diagnoses     Pyelectasis of fetus on prenatal ultrasound    -  1    Encounter for supervision of normal first pregnancy in third trimester        30 weeks gestation of pregnancy           Follow-ups after your visit        Your next 10 appointments already scheduled     Oct 26, 2017  1:40 PM CDT   US OB SINGLE FOLLOW UP REPEAT with WEUS1   [facility] [place] ([facility] [place])    [address]  [place] [address]   [phone]           Please bring a list of your medicines (including vitamins, minerals and over-the-counter drugs). Also, tell your doctor about any allergies you may have. Wear comfortable clothes and leave your valuables at home.  If you re less than 20 weeks drink four 8-ounce glasses of fluid an hour before your exam. If you need to empty your bladder before your exam, try to release only a little urine. Then, drink another glass of fluid.  You may have up to two family members in the exam room. If you bring a small child, an adult must be there to care for him or her.  Please call the Imaging Department at your exam site with any questions.            Oct 26, 2017  2:20 PM CDT   ESTABLISHED PRENATAL with Stephanie Hanna,    [facility] [place] ([facility] [place])    [address]  [place] [address] [phone]              Future tests that were ordered for you today     Open Future Orders        Priority Expected Expires Ordered    US OB Single Follow Up Repeat Routine  10/10/2018 10/9/2017            Who to contact     If you have questions or need follow up information about today's  clinic visit or your schedule please contact [facility] [place] directly at [phone].  Normal or non-critical lab and imaging results will be communicated to you by MyChart, letter or phone within 4 business days after the clinic has received the results. If you do not hear from us within 7 days, please contact the clinic through Flats&Houseshart or phone. If you have a critical or abnormal lab result, we will notify you by phone as soon as possible.  Submit refill requests through Tivity or call your pharmacy and they will forward the refill request to us. Please allow 3 business days for your refill to be completed.          Additional Information About Your Visit        Flats&HousesharOuner Information     Tivity gives you secure access to your electronic health record. If you see a primary care provider, you can also send messages to your care team and make appointments. If you have questions, please call your primary care clinic.  If you do not have a primary care provider, please call [phone] and they will assist you.        Care EveryWhere ID     This is your Care EveryWhere ID. This could be used by other organizations to access your [place] medical records  [id number]        Your Vitals Were     Last Period BMI (Body Mass Index)                03/09/2017 32.2 kg/m2           Blood Pressure from Last 3 Encounters:   10/09/17 117/73   09/22/17 122/76   08/25/17 114/70    Weight from Last 3 Encounters:   10/09/17 211 lb 12.8 oz (96.1 kg)   09/22/17 211 lb (95.7 kg)   08/25/17 204 lb 12.8 oz (92.9 kg)               Primary Care Provider Office Phone # Fax #    Luna Jiang [phone] [address] [id number]        Equal Access to Services     MORE [place] : Trey Alcocer, samson maldonado, taty fitzgerald, bhavin truong. So [facility] [phone].    ATENCIÓN: Si habla español, tiene a corado disposición servicios  mor de asistencia lingüística. Harriet paiz [phone].    We comply with applicable federal civil rights laws and [place] laws. We do not discriminate on the basis of race, color, national origin, age, disability, sex, sexual orientation, or gender identity.            Thank you!     Thank you for choosing [facility] [place]  for your care. Our goal is always to provide you with excellent care. Hearing back from our patients is one way we can continue to improve our services. Please take a few minutes to complete the written survey that you may receive in the mail after your visit with us. Thank you!             Your Updated Medication List - Protect others around you: Learn how to safely use, store and throw away your medicines at www.disposemymeds.org.          This list is accurate as of: 10/9/17  2:35 PM.  Always use your most recent med list.                   Brand Name Dispense Instructions for use Diagnosis    PRENATAL VITAMIN PO       Encounter for supervision of normal first pregnancy in second trimester

## 2017-10-09 NOTE — PROGRESS NOTES
No loss of fluid/vaginal bleeding/regular contractions. + FM  Mood appropriate.   -Pyelectasis 5mm b/l at anatomy US. Repeat US next visit.   - Labor precautions. F/U 2wk.    Stephanie Banerjee Masters, DO

## 2017-10-26 ENCOUNTER — PRENATAL OFFICE VISIT (OUTPATIENT)
Dept: OBGYN | Facility: CLINIC | Age: 32
End: 2017-10-26
Attending: OBSTETRICS & GYNECOLOGY
Payer: COMMERCIAL

## 2017-10-26 ENCOUNTER — RADIANT APPOINTMENT (OUTPATIENT)
Dept: ULTRASOUND IMAGING | Facility: CLINIC | Age: 32
End: 2017-10-26
Attending: OBSTETRICS & GYNECOLOGY
Payer: COMMERCIAL

## 2017-10-26 VITALS — WEIGHT: 215 LBS | SYSTOLIC BLOOD PRESSURE: 114 MMHG | BODY MASS INDEX: 32.69 KG/M2 | DIASTOLIC BLOOD PRESSURE: 80 MMHG

## 2017-10-26 DIAGNOSIS — Z23 NEED FOR PROPHYLACTIC VACCINATION AND INOCULATION AGAINST INFLUENZA: ICD-10-CM

## 2017-10-26 DIAGNOSIS — O35.EXX0 PYELECTASIS OF FETUS ON PRENATAL ULTRASOUND: ICD-10-CM

## 2017-10-26 DIAGNOSIS — Z3A.30 30 WEEKS GESTATION OF PREGNANCY: ICD-10-CM

## 2017-10-26 DIAGNOSIS — O35.EXX0 PYELECTASIS OF FETUS ON PRENATAL ULTRASOUND: Primary | ICD-10-CM

## 2017-10-26 DIAGNOSIS — Z3A.33 33 WEEKS GESTATION OF PREGNANCY: ICD-10-CM

## 2017-10-26 PROCEDURE — 76816 OB US FOLLOW-UP PER FETUS: CPT | Performed by: OBSTETRICS & GYNECOLOGY

## 2017-10-26 PROCEDURE — 99207 ZZC PRENATAL VISIT: CPT | Performed by: OBSTETRICS & GYNECOLOGY

## 2017-10-26 NOTE — PROGRESS NOTES

## 2017-10-26 NOTE — MR AVS SNAPSHOT
After Visit Summary   10/26/2017    Maddy Anglin    MRN: [id number]           Patient Information     Date Of Birth          1985        Visit Information        Provider Department      10/26/2017 2:20 PM Stephanie Hanan DO [facility]        Today's Diagnoses     Need for prophylactic vaccination and inoculation against influenza    -  1    Encounter for supervision of normal first pregnancy in third trimester           Follow-ups after your visit        Your next 10 appointments already scheduled     Nov 09, 2017  2:50 PM CST   ESTABLISHED PRENATAL with Stephanie Hanna DO   [facility] ([facility])    [address]  [facility] [id number] [phone]              Who to contact     If you have questions or need follow up information about today's clinic visit or your schedule please contact [facility] [place] directly at [phone].  Normal or non-critical lab and imaging results will be communicated to you by [email or web address], letter or phone within 4 business days after the clinic has received the results. If you do not hear from us within 7 days, please contact the clinic through [email or web address] or phone. If you have a critical or abnormal lab result, we will notify you by phone as soon as possible.  Submit refill requests through Chegg or call your pharmacy and they will forward the refill request to us. Please allow 3 business days for your refill to be completed.          Additional Information About Your Visit        MyChart Information     Chegg gives you secure access to your electronic health record. If you see a primary care provider, you can also send messages to your care team and make appointments. If you have questions, please call your primary care clinic.  If you do not have a primary care provider, please call [phone] and they will assist you.        Care EveryWhere ID      This is your Care EveryWhere ID. This could be used by other organizations to access your [place] medical records  [id number]        Your Vitals Were     Last Period Breastfeeding? BMI (Body Mass Index)             03/09/2017 No 32.69 kg/m2          Blood Pressure from Last 3 Encounters:   10/26/17 114/80   10/09/17 117/73   09/22/17 122/76    Weight from Last 3 Encounters:   10/26/17 215 lb (97.5 kg)   10/09/17 211 lb 12.8 oz (96.1 kg)   09/22/17 211 lb (95.7 kg)              We Performed the Following     FLU VAC, SPLIT VIRUS IM > 3 YO (QUADRIVALENT) [02939]     Vaccine Administration, Initial [28990]        Primary Care Provider Office Phone # Fax #    Luna Jiang [phone] [address] [address]        Equal Access to Services     PATEL FALLON : Hadii aad ku hadasho Soana, waaxda luqadaha, qaybta kaalmada adeegyada, bhavin reese . So [facility] [phone].    ATENCIÓN: Si habla español, tiene a corado disposición servicios gratuitos de asistencia lingüística. Llame al [phone].    We comply with applicable federal civil rights laws and [place] laws. We do not discriminate on the basis of race, color, national origin, age, disability, sex, sexual orientation, or gender identity.            Thank you!     Thank you for choosing [facility] [place]  for your care. Our goal is always to provide you with excellent care. Hearing back from our patients is one way we can continue to improve our services. Please take a few minutes to complete the written survey that you may receive in the mail after your visit with us. Thank you!             Your Updated Medication List - Protect others around you: Learn how to safely use, store and throw away your medicines at www.disposemymeds.org.          This list is accurate as of: 10/26/17  2:36 PM.  Always use your most recent med list.                   Brand Name Dispense  Instructions for use Diagnosis    PRENATAL VITAMIN PO       Encounter for supervision of normal first pregnancy in second trimester

## 2017-11-09 ENCOUNTER — PRENATAL OFFICE VISIT (OUTPATIENT)
Dept: OBGYN | Facility: CLINIC | Age: 32
End: 2017-11-09
Payer: COMMERCIAL

## 2017-11-09 VITALS — SYSTOLIC BLOOD PRESSURE: 118 MMHG | DIASTOLIC BLOOD PRESSURE: 72 MMHG | WEIGHT: 216 LBS | BODY MASS INDEX: 32.84 KG/M2

## 2017-11-09 DIAGNOSIS — Z34.03 ENCOUNTER FOR SUPERVISION OF NORMAL FIRST PREGNANCY IN THIRD TRIMESTER: ICD-10-CM

## 2017-11-09 PROCEDURE — 99207 ZZC PRENATAL VISIT: CPT | Performed by: OBSTETRICS & GYNECOLOGY

## 2017-11-09 NOTE — PROGRESS NOTES
No loss of fluid/vaginal bleeding/regular contractions. + FM  Many questions about labor, delivery, circ, breast pump, travel.  -GBS next visit  -All questions answered and addressed.   - Labor precautions. F/U 1wk    Stephanie Banerjee Masters, DO

## 2017-11-09 NOTE — MR AVS SNAPSHOT
After Visit Summary   11/9/2017    Maddy Anglin    MRN: [id number]           Patient Information     Date Of Birth          1985        Visit Information        Provider Department      11/9/2017 2:50 PM Stephanie Hanna DO [facility]        Today's Diagnoses     Encounter for supervision of normal first pregnancy in third trimester           Follow-ups after your visit        Follow-up notes from your care team     Return in about 1 week (around 11/16/2017).      Your next 10 appointments already scheduled     Nov 16, 2017  2:20 PM CST   ESTABLISHED PRENATAL with Stephanie Hanna DO   [facility] ([facility]    [address] [place] [address] [phone]              Who to contact     If you have questions or need follow up information about today's clinic visit or your schedule please contact [facility] [place] directly at [phone].  Normal or non-critical lab and imaging results will be communicated to you by Neuravihart, letter or phone within 4 business days after the clinic has received the results. If you do not hear from us within 7 days, please contact the clinic through [employer] or phone. If you have a critical or abnormal lab result, we will notify you by phone as soon as possible.  Submit refill requests through PacketHop or call your pharmacy and they will forward the refill request to us. Please allow 3 business days for your refill to be completed.          Additional Information About Your Visit        MyChart Information     PacketHop gives you secure access to your electronic health record. If you see a primary care provider, you can also send messages to your care team and make appointments. If you have questions, please call your primary care clinic.  If you do not have a primary care provider, please call [phone] and they will assist you.        Care  EveryWhere ID     This is your Care EveryWhere ID. This could be used by other organizations to access your [facility] medical records  [id number]        Your Vitals Were     Last Period Breastfeeding? BMI (Body Mass Index)             03/09/2017 No 32.84 kg/m2          Blood Pressure from Last 3 Encounters:   11/09/17 118/72   10/26/17 114/80   10/09/17 117/73    Weight from Last 3 Encounters:   11/09/17 216 lb (98 kg)   10/26/17 215 lb (97.5 kg)   10/09/17 211 lb 12.8 oz (96.1 kg)              Today, you had the following     No orders found for display       Primary Care Provider Office Phone # Fax #    Luna Jiang [phone] [address] [address]        Equal Access to Services     PATEL FALLON : Hadii piyush dent hadasho Soomaali, waaxda luqadaha, qaybta kaalmada stalinyasen, bhavin reese . So [facility] [phone].    ATENCIÓN: Si habla español, tiene a corado disposición servicios gratuitos de asistencia lingüística. Harriet al [phone].    We comply with applicable federal civil rights laws and [place] laws. We do not discriminate on the basis of race, color, national origin, age, disability, sex, sexual orientation, or gender identity.            Thank you!     Thank you for choosing [facility] [place]  for your care. Our goal is always to provide you with excellent care. Hearing back from our patients is one way we can continue to improve our services. Please take a few minutes to complete the written survey that you may receive in the mail after your visit with us. Thank you!             Your Updated Medication List - Protect others around you: Learn how to safely use, store and throw away your medicines at www.disposemymeds.org.          This list is accurate as of: 11/9/17  3:43 PM.  Always use your most recent med list.                   Brand Name Dispense Instructions for use Diagnosis    PRENATAL VITAMIN PO        Encounter for supervision of normal first pregnancy in second trimester

## 2017-11-12 ENCOUNTER — NURSE TRIAGE (OUTPATIENT)
Dept: NURSING | Facility: CLINIC | Age: 32
End: 2017-11-12

## 2017-11-12 NOTE — TELEPHONE ENCOUNTER
"  Reason for Disposition    [1] MILD abdominal pain (e.g., doesn't interfere with normal activities) AND [2] constant AND [3] present > 2 hours    Additional Information    Negative: Passed out (i.e., lost consciousness, collapsed and was not responding)    Negative: Shock suspected (e.g., cold/pale/clammy skin, too weak to stand, low BP, rapid pulse)    Negative: Difficult to awaken or acting confused  (e.g., disoriented, slurred speech)    Negative: [1] SEVERE abdominal pain (e.g., excruciating) AND [2] constant AND [3] present > 1 hour    Negative: SEVERE vaginal bleeding (e.g., continuous red blood from vagina, or large blood clots)    Negative: Sounds like a life-threatening emergency to the triager    Negative: Followed an abdomen (stomach) injury    Negative: [1] Having contractions or other symptoms of labor AND [2] >= 37 weeks pregnant (i.e., term pregnancy)    Negative: [1] Having contractions or other symptoms of labor AND [2] < 37 weeks pregnant (i.e., )    Negative: [1] Abdominal pain AND [2] pregnant < 20 weeks    Negative: [1] Vomiting AND [2] contains red blood or black (\"coffee ground\") material  (Exception: few red streaks in vomit that only happened once)    Negative: MODERATE-SEVERE abdominal pain (e.g., interferes with normal activities, awakens from sleep)    Negative: Vaginal bleeding or spotting    Negative: [1] Baby moving less today (e.g., kick count < 5 in 1 hour or < 10 in 2 hours) AND [2] pregnant 23 or more weeks    Negative: Leakage of fluid from vagina    Negative: New hand or face swelling    Negative: Blurred vision or visual changes    Negative: [1] SEVERE headache AND [2] not relieved with acetaminophen (e.g., Tylenol)    Answer Assessment - Initial Assessment Questions  1. LOCATION: \"Where does it hurt?\"       Lower abdomen/pelvic area equates to menstrual cramps  2. RADIATION: \"Does the pain shoot anywhere else?\" (e.g., chest, back)      denies  3. ONSET: \"When did the " "pain begin?\" (Minutes, hours or days ago)       Last night  4. ONSET: \"Gradual or sudden onset?\"      sudden  5. PATTERN: \"Does the pain come and go, or has it been constant since it started?\"       constant  6. SEVERITY: \"How bad is the pain?\" \"What does it keep you from doing?\"  (e.g., Scale 1-10; mild, moderate, or severe)    - MILD (1-3): doesn't interfere with normal activities, abdomen soft and not tender to touch     - MODERATE (4-7): interferes with normal activities or awakens from sleep, tender to touch     - SEVERE (8-10): excruciating pain, doubled over, unable to do any normal activities      2-3/10 mild  7. RECURRENT SYMPTOM: \"Have you ever had this type of abdominal pain before?\" If so, ask: \"When was the last time?\" and \"What happened that time?\"       no  8. CAUSE: \"What do you think is causing the abdominal pain?      Not sure  9. RELIEVING/AGGRAVATING FACTORS: \"What makes it better or worse?\" (e.g., antacids, bowel movement, movement)      No relieving factors  10. OTHER SYMPTOMS: \"Has there been any vaginal bleeding, fever, vomiting, diarrhea, or urine problems?\"        No fever she has a couple hours of vomiting and diarrhea last night  11. KITA: \"What date are you expecting to deliver?\"        35 weeks    Protocols used: PREGNANCY - ABDOMINAL PAIN GREATER THAN 20 WEEKS EGA-ADULT-      Paged on call OB Dr. Gutiérrez via smart web at 8:29 am Call pt Maddy Anglin [phone], [birth year], MRN [id number], PCP Dr. Hanna, KITA 12/14/17, mild constant lower abdominal cramps today, and diarrhea and vomiting yesterday. 2nd level triage.      Tiara Corbin, RN, BSN  [facility]    "

## 2017-11-16 ENCOUNTER — PRENATAL OFFICE VISIT (OUTPATIENT)
Dept: OBGYN | Facility: CLINIC | Age: 32
End: 2017-11-16
Payer: COMMERCIAL

## 2017-11-16 VITALS — DIASTOLIC BLOOD PRESSURE: 76 MMHG | BODY MASS INDEX: 33.15 KG/M2 | WEIGHT: 218 LBS | SYSTOLIC BLOOD PRESSURE: 114 MMHG

## 2017-11-16 DIAGNOSIS — Z34.03 ENCOUNTER FOR SUPERVISION OF NORMAL FIRST PREGNANCY IN THIRD TRIMESTER: Primary | ICD-10-CM

## 2017-11-16 DIAGNOSIS — Z36.85 SCREENING, ANTENATAL, FOR STREPTOCOCCUS B: ICD-10-CM

## 2017-11-16 PROCEDURE — 99207 ZZC PRENATAL VISIT: CPT | Performed by: OBSTETRICS & GYNECOLOGY

## 2017-11-16 PROCEDURE — 87653 STREP B DNA AMP PROBE: CPT | Performed by: OBSTETRICS & GYNECOLOGY

## 2017-11-16 PROCEDURE — 87186 SC STD MICRODIL/AGAR DIL: CPT | Performed by: OBSTETRICS & GYNECOLOGY

## 2017-11-16 NOTE — MR AVS SNAPSHOT
After Visit Summary   2017    Maddy Anglin    MRN: [id number]           Patient Information     Date Of Birth          1985        Visit Information        Provider Department      2017 2:20 PM Stephanie Hanna,  [facility] [place]        Today's Diagnoses     Encounter for supervision of normal first pregnancy in third trimester    -  1    Screening, , for Streptococcus B           Follow-ups after your visit        Follow-up notes from your care team     Return in about 1 week (around 2017).      Your next 10 appointments already scheduled     2017  4:10 PM CST   ESTABLISHED PRENATAL with Stephanie Hanna DO   [facility] [place] ([facility] [place])    [address]  [place] [address]   [phone]            Dec 07, 2017  4:10 PM CST   ESTABLISHED PRENATAL with Stephanie Hanna DO   [facility] [place] ([facility] [place])    [address]  [place] [address]   [phone]            Dec 14, 2017  3:20 PM CST   ESTABLISHED PRENATAL with Stephanie Hanna DO   [facility] [place] ([facility] [place])    [address]  [place] [address]   [phone]              Who to contact     If you have questions or need follow up information about today's clinic visit or your schedule please contact [facility] [place] directly at [phone].  Normal or non-critical lab and imaging results will be communicated to you by MyChart, letter or phone within 4 business days after the clinic has received the results. If you do not hear from us within 7 days, please contact the clinic through PagerDutyhart or phone. If you have a critical or abnormal lab result, we will notify you by phone as soon as possible.  Submit refill requests through Luqit or call your pharmacy and they will  forward the refill request to us. Please allow 3 business days for your refill to be completed.          Additional Information About Your Visit        Infogamihart Information     Scannx gives you secure access to your electronic health record. If you see a primary care provider, you can also send messages to your care team and make appointments. If you have questions, please call your primary care clinic.  If you do not have a primary care provider, please call [phone] and they will assist you.        Care EveryWhere ID     This is your Care EveryWhere ID. This could be used by other organizations to access your [place] medical records  [id number]        Your Vitals Were     Last Period BMI (Body Mass Index)                03/09/2017 33.15 kg/m2           Blood Pressure from Last 3 Encounters:   11/16/17 114/76   11/09/17 118/72   10/26/17 114/80    Weight from Last 3 Encounters:   11/16/17 218 lb (98.9 kg)   11/09/17 216 lb (98 kg)   10/26/17 215 lb (97.5 kg)              We Performed the Following     Group B strep PCR        Primary Care Provider Office Phone # Fax #    Luna Jiang [phone]       [address] [address]        Equal Access to Services     PATEL FALLON : Hadii piyush ku hadasho Sokaushikali, waaxda luqadaha, qaybta kaalmada adeegyada, bhavin truong. So [facility] [phone].    ATENCIÓN: Si habla español, tiene a corado disposición servicios gratuitos de asistencia lingüística. Llame al [phone].    We comply with applicable federal civil rights laws and [place] laws. We do not discriminate on the basis of race, color, national origin, age, disability, sex, sexual orientation, or gender identity.            Thank you!     Thank you for choosing [facility] [place]  for your care. Our goal is always to provide you with excellent care. Hearing back from our patients is one way we can continue to improve our services.  Please take a few minutes to complete the written survey that you may receive in the mail after your visit with us. Thank you!             Your Updated Medication List - Protect others around you: Learn how to safely use, store and throw away your medicines at www.disposemymeds.org.          This list is accurate as of: 11/16/17  3:14 PM.  Always use your most recent med list.                   Brand Name Dispense Instructions for use Diagnosis    PRENATAL VITAMIN PO       Encounter for supervision of normal first pregnancy in second trimester

## 2017-11-16 NOTE — PROGRESS NOTES
No loss of fluid/vaginal bleeding/regular contractions. + FM  -GBS collected.   - Labor precautions. F/U 1wk.     Stephanie Banerjee Masters, DO

## 2017-11-18 LAB
GP B STREP DNA SPEC QL NAA+PROBE: POSITIVE
SPECIMEN SOURCE: ABNORMAL

## 2017-11-20 PROBLEM — B95.1 POSITIVE GBS TEST: Status: ACTIVE | Noted: 2017-11-20

## 2017-11-22 LAB
BACTERIA SPEC CULT: ABNORMAL
SPECIMEN SOURCE: ABNORMAL

## 2017-11-22 NOTE — PROGRESS NOTES
Please inform of results --GBS positive; will need abx in labor; can discuss with Dr. Hanna at next visit

## 2017-11-27 ENCOUNTER — TELEPHONE (OUTPATIENT)
Dept: OBGYN | Facility: CLINIC | Age: 32
End: 2017-11-27

## 2017-11-27 ENCOUNTER — NURSE TRIAGE (OUTPATIENT)
Dept: NURSING | Facility: CLINIC | Age: 32
End: 2017-11-27

## 2017-11-27 NOTE — TELEPHONE ENCOUNTER
"Clinic Action Needed:Yes, Please place follow up call to patient  Reason for Call:Patient calling reporting she got up to go to the bathroom this morning \"and sneezed\", following sneezing she had some bright red to pink vaginal discharge on toilet paper. \" Bleeding has stopped. Denies pain or contractions. Fetal movement is positive.  Denies further symptoms.   Patient Recommendations/Teaching:Reviewed to call back with any change in symptoms, or any further bleeding.  Routed to:Dr Hanna Nurse Scottie [place] RN  [facility]        Additional Information    Negative: Passed out (i.e., lost consciousness, collapsed and was not responding)    Negative: Shock suspected (e.g., cold/pale/clammy skin, too weak to stand, low BP, rapid pulse)    Negative: Difficult to awaken or acting confused  (e.g., disoriented, slurred speech)    Negative: SEVERE vaginal bleeding (e.g., continuous red blood from vagina, large blood clots)    Negative: [1] SEVERE abdominal pain (e.g., excruciating) AND [2] constant AND [3] present > 1 hour    Negative: Sounds like a life-threatening emergency to the triager    Negative: [1] Vaginal bleeding AND [2] pregnant < 20 weeks    Negative: MILD-MODERATE vaginal bleeding (i.e., small to medium clots; like mild menstrual period)    Negative: Abdominal pain or having contractions    Negative: Leakage of fluid from vagina (or caller thinks she has ruptured her bag of mcdaniel)    Negative: Baby moving less today (e.g., kick count < 5 in 1 hour or < 10 in 2 hours)    Negative: [1] Pregnant 24-36 weeks () AND [2] pinkish or brownish mucous discharge    Negative: [1] Pregnant 20-23 weeks AND [2] pinkish or brownish mucous discharge    Negative: [1] Pregnant > 36 weeks AND [2] pinkish or brownish mucous discharge (all triage questions negative)    Negative: [1] Pregnant > 36 weeks (term) AND [2] passed a small glob or chunk of mucous (may look like gelatin or snot) (all triage " questions negative)    Negative: Slight spotting after sexual intercourse (brief episode) (all triage questions negative)    Negative: Slight spotting after a pelvic examination (brief episode) (all triage questions negative)    Single episode of faint spotting (e.g., noted when wiping after going to bathroom) (all triage questions negative)    Protocols used: PREGNANCY - VAGINAL BLEEDING GREATER THAN 20 WEEKS [id number]

## 2017-11-27 NOTE — TELEPHONE ENCOUNTER
Patient calling back as she has had again a pink tinge on her TP when wiping. She also has had 5-6 sharp pains on her right side of abdomen that have been spaced at every 10 minutes. Her belly was not hard, she was able to breath through these pains as they only lasted a couple seconds but did stop her in her tracks. No LOF, +FM. +BM a few hours ago with no straining, and no recent IC. Patient has increased hydration. Patient was told to call back if anything changed. Any concerns?

## 2017-11-27 NOTE — TELEPHONE ENCOUNTER
"Clinic Action Needed:Yes, Please place follow up call to patient [phone]  Reason for Call:Patient calling reporting she got up to go to the bathroom this morning \"and sneezed\", following sneezing she had some bright red to pink vaginal discharge on toilet paper. Patient is 37 weeks gestation. Bleeding has stopped. Denies pain or contractions. Fetal movement is positive.  Denies further symptoms.   Patient Recommendations/Teaching:Reviewed to call back with any change in symptoms, or any further bleeding.  Routed to:Dr Hanna Nurse Scottie [place], RN  [facility]          "

## 2017-11-27 NOTE — TELEPHONE ENCOUNTER
37w4d; KITA: 12/14/17  Called pt, states that this morning when she was urinating she sneezed and noticed a harsher stream of urine. After urinating wiped and noticed bright red blood and pink vaginal discharge covering the tolepaper and after the second time she wiped there was no more blood and none since then. Denies LOF, denies contractions, states she has had some sporadic lower pelvic like cramps that come and go-not timeable, denies intercourse or cervical exam within the last 1-2 days, states before she called this morning and spoke with the [facility] Nurse Advisor she has not having a lot of fetal movement but since the call (noted at 6:20 AM) she has had normal positive fetal movement. Has not noticed her mucous plug come out.   Informed pt would speak with Dr. Hanna and then get back to her.      Spoke with Dr. Hanna in person and she indicated to monitor at this time, fluids, and give precautions. Called pt and informed her of this information, if bleeding comes back, if starts noticing a pattern to her cramps, if the cramping starts intensifying in pain, LOF or decrease in fetal movement to let us know. Pt verbalized understanding.      Closing encounter.

## 2017-11-27 NOTE — TELEPHONE ENCOUNTER
Pink/red/brown tinged mucous/discharge can be normal part of early cervical changes, early labor. If bleeding becomes bright red or overt blood seen then needs to go to the [facility] for assessment.    Stephanie Banerjee Masters, DO

## 2017-11-30 ENCOUNTER — PRENATAL OFFICE VISIT (OUTPATIENT)
Dept: OBGYN | Facility: CLINIC | Age: 32
End: 2017-11-30
Payer: COMMERCIAL

## 2017-11-30 VITALS — SYSTOLIC BLOOD PRESSURE: 112 MMHG | DIASTOLIC BLOOD PRESSURE: 74 MMHG | WEIGHT: 219 LBS | BODY MASS INDEX: 33.3 KG/M2

## 2017-11-30 DIAGNOSIS — Z34.03 ENCOUNTER FOR SUPERVISION OF NORMAL FIRST PREGNANCY IN THIRD TRIMESTER: ICD-10-CM

## 2017-11-30 DIAGNOSIS — B95.1 POSITIVE GBS TEST: ICD-10-CM

## 2017-11-30 PROCEDURE — 99207 ZZC PRENATAL VISIT: CPT | Performed by: OBSTETRICS & GYNECOLOGY

## 2017-11-30 NOTE — MR AVS SNAPSHOT
After Visit Summary   11/30/2017    Maddy Anglin    MRN: [id number]           Patient Information     Date Of Birth          1985        Visit Information        Provider Department      11/30/2017 4:10 PM Stephanie Hanna DO [facility] [place]        Today's Diagnoses     Positive GBS test        Encounter for supervision of normal first pregnancy in third trimester           Follow-ups after your visit        Follow-up notes from your care team     Return in about 1 week (around 12/7/2017).      Your next 10 appointments already scheduled     Dec 07, 2017  4:10 PM CST   ESTABLISHED PRENATAL with Stephanie Hanna DO   [facility] [place] ([facility] [place])    [address]  [place] [id number] [phone]            Dec 14, 2017  3:20 PM CST   ESTABLISHED PRENATAL with Stephanie Hanna DO   [facility] [place] ([facility] [place])    [address]  [place] [id number] [phone]              Who to contact     If you have questions or need follow up information about today's clinic visit or your schedule please contact [facility] [place] directly at [phone].  Normal or non-critical lab and imaging results will be communicated to you by [employer], letter or phone within 4 business days after the clinic has received the results. If you do not hear from us within 7 days, please contact the clinic through [employer] or phone. If you have a critical or abnormal lab result, we will notify you by phone as soon as possible.  Submit refill requests through [employer] or call your pharmacy and they will forward the refill request to us. Please allow 3 business days for your refill to be completed.          Additional Information About Your Visit        [employer] Information     [employer] gives you secure access to your electronic health record. If you see  a primary care provider, you can also send messages to your care team and make appointments. If you have questions, please call your primary care clinic.  If you do not have a primary care provider, please call [phone] and they will assist you.        Care EveryWhere ID     This is your Care EveryWhere ID. This could be used by other organizations to access your [place] medical records  [id number]        Your Vitals Were     Last Period BMI (Body Mass Index)                03/09/2017 33.3 kg/m2           Blood Pressure from Last 3 Encounters:   11/30/17 112/74   11/16/17 114/76   11/09/17 118/72    Weight from Last 3 Encounters:   11/30/17 219 lb (99.3 kg)   11/16/17 218 lb (98.9 kg)   11/09/17 216 lb (98 kg)              Today, you had the following     No orders found for display       Primary Care Provider Office Phone # Fax #    Luna Jiang [phone]       [address] [address]        Equal Access to Services     [facility]: Hadii aad ku hadasho Soomaali, waaxda luqadaha, qaybta kaalmada adeegyasen, bhavin reese . So [facility] [phone].    ATENCIÓN: Si habla español, tiene a corado disposición servicios gratuitos de asistencia lingüística. Llame al [phone].    We comply with applicable federal civil rights laws and [place] laws. We do not discriminate on the basis of race, color, national origin, age, disability, sex, sexual orientation, or gender identity.            Thank you!     Thank you for choosing [facility] [place]  for your care. Our goal is always to provide you with excellent care. Hearing back from our patients is one way we can continue to improve our services. Please take a few minutes to complete the written survey that you may receive in the mail after your visit with us. Thank you!             Your Updated Medication List - Protect others around you: Learn how to safely use, store and throw away  your medicines at www.disposemymeds.org.          This list is accurate as of: 11/30/17  9:35 PM.  Always use your most recent med list.                   Brand Name Dispense Instructions for use Diagnosis    PRENATAL VITAMIN PO       Encounter for supervision of normal first pregnancy in second trimester

## 2017-11-30 NOTE — PROGRESS NOTES
No loss of fluid/vaginal bleeding/regular contractions. + FM  No further bleeding since when called on Monday.   SVE cl/th/high, moderate, midposition  -GBS pos. Tx in labor  -Labor precautions. F/U 1wk    Stephanie Mckenzies, DO

## 2017-12-02 ENCOUNTER — HOSPITAL ENCOUNTER (INPATIENT)
Facility: CLINIC | Age: 32
LOS: 3 days | Discharge: HOME | End: 2017-12-05
Attending: OBSTETRICS & GYNECOLOGY | Admitting: OBSTETRICS & GYNECOLOGY
Payer: COMMERCIAL

## 2017-12-02 ENCOUNTER — NURSE TRIAGE (OUTPATIENT)
Dept: NURSING | Facility: CLINIC | Age: 32
End: 2017-12-02

## 2017-12-02 LAB
A1 MICROGLOB PLACENTAL VAG QL: ABNORMAL
ABO + RH BLD: NORMAL
ABO + RH BLD: NORMAL
SPECIMEN EXP DATE BLD: NORMAL

## 2017-12-02 PROCEDURE — 0UQMXZZ REPAIR VULVA, EXTERNAL APPROACH: ICD-10-PCS | Performed by: OBSTETRICS & GYNECOLOGY

## 2017-12-02 PROCEDURE — 3E0P7VZ INTRODUCTION OF HORMONE INTO FEMALE REPRODUCTIVE, VIA NATURAL OR ARTIFICIAL OPENING: ICD-10-PCS | Performed by: OBSTETRICS & GYNECOLOGY

## 2017-12-02 PROCEDURE — 99215 OFFICE O/P EST HI 40 MIN: CPT | Mod: 25

## 2017-12-02 PROCEDURE — S0191 MISOPROSTOL, ORAL, 200 MCG: HCPCS | Performed by: OBSTETRICS & GYNECOLOGY

## 2017-12-02 PROCEDURE — 00HU33Z INSERTION OF INFUSION DEVICE INTO SPINAL CANAL, PERCUTANEOUS APPROACH: ICD-10-PCS | Performed by: ANESTHESIOLOGY

## 2017-12-02 PROCEDURE — 25000132 ZZH RX MED GY IP 250 OP 250 PS 637: Performed by: OBSTETRICS & GYNECOLOGY

## 2017-12-02 PROCEDURE — 3E0R3BZ INTRODUCTION OF ANESTHETIC AGENT INTO SPINAL CANAL, PERCUTANEOUS APPROACH: ICD-10-PCS | Performed by: ANESTHESIOLOGY

## 2017-12-02 PROCEDURE — 86901 BLOOD TYPING SEROLOGIC RH(D): CPT | Performed by: OBSTETRICS & GYNECOLOGY

## 2017-12-02 PROCEDURE — 25000128 H RX IP 250 OP 636: Performed by: OBSTETRICS & GYNECOLOGY

## 2017-12-02 PROCEDURE — 12000029 ZZH R&B OB INTERMEDIATE

## 2017-12-02 PROCEDURE — 86900 BLOOD TYPING SEROLOGIC ABO: CPT | Performed by: OBSTETRICS & GYNECOLOGY

## 2017-12-02 PROCEDURE — 59025 FETAL NON-STRESS TEST: CPT

## 2017-12-02 PROCEDURE — 36415 COLL VENOUS BLD VENIPUNCTURE: CPT | Performed by: OBSTETRICS & GYNECOLOGY

## 2017-12-02 PROCEDURE — 86780 TREPONEMA PALLIDUM: CPT | Performed by: OBSTETRICS & GYNECOLOGY

## 2017-12-02 PROCEDURE — 0UQG7ZZ REPAIR VAGINA, VIA NATURAL OR ARTIFICIAL OPENING: ICD-10-PCS | Performed by: OBSTETRICS & GYNECOLOGY

## 2017-12-02 RX ORDER — ONDANSETRON 2 MG/ML
4 INJECTION INTRAMUSCULAR; INTRAVENOUS EVERY 6 HOURS PRN
Status: DISCONTINUED | OUTPATIENT
Start: 2017-12-02 | End: 2017-12-05 | Stop reason: HOSPADM

## 2017-12-02 RX ORDER — NALOXONE HYDROCHLORIDE 0.4 MG/ML
.1-.4 INJECTION, SOLUTION INTRAMUSCULAR; INTRAVENOUS; SUBCUTANEOUS
Status: DISCONTINUED | OUTPATIENT
Start: 2017-12-02 | End: 2017-12-05 | Stop reason: HOSPADM

## 2017-12-02 RX ORDER — MISOPROSTOL 100 UG/1
25 TABLET ORAL
Status: DISPENSED | OUTPATIENT
Start: 2017-12-02 | End: 2017-12-02

## 2017-12-02 RX ORDER — OXYTOCIN/0.9 % SODIUM CHLORIDE 30/500 ML
100-340 PLASTIC BAG, INJECTION (ML) INTRAVENOUS CONTINUOUS PRN
Status: COMPLETED | OUTPATIENT
Start: 2017-12-02 | End: 2017-12-03

## 2017-12-02 RX ORDER — FENTANYL CITRATE 50 UG/ML
50-100 INJECTION, SOLUTION INTRAMUSCULAR; INTRAVENOUS
Status: DISCONTINUED | OUTPATIENT
Start: 2017-12-02 | End: 2017-12-05 | Stop reason: HOSPADM

## 2017-12-02 RX ORDER — OXYCODONE AND ACETAMINOPHEN 5; 325 MG/1; MG/1
1 TABLET ORAL
Status: DISCONTINUED | OUTPATIENT
Start: 2017-12-02 | End: 2017-12-05 | Stop reason: HOSPADM

## 2017-12-02 RX ORDER — HYDROXYZINE HYDROCHLORIDE 50 MG/1
50 TABLET, FILM COATED ORAL
Status: DISCONTINUED | OUTPATIENT
Start: 2017-12-02 | End: 2017-12-05 | Stop reason: HOSPADM

## 2017-12-02 RX ORDER — ONDANSETRON 2 MG/ML
4 INJECTION INTRAMUSCULAR; INTRAVENOUS EVERY 6 HOURS PRN
Status: DISCONTINUED | OUTPATIENT
Start: 2017-12-02 | End: 2017-12-02

## 2017-12-02 RX ORDER — ACETAMINOPHEN 325 MG/1
650 TABLET ORAL EVERY 4 HOURS PRN
Status: DISCONTINUED | OUTPATIENT
Start: 2017-12-02 | End: 2017-12-05 | Stop reason: HOSPADM

## 2017-12-02 RX ORDER — TERBUTALINE SULFATE 1 MG/ML
0.25 INJECTION, SOLUTION SUBCUTANEOUS
Status: DISCONTINUED | OUTPATIENT
Start: 2017-12-02 | End: 2017-12-05 | Stop reason: HOSPADM

## 2017-12-02 RX ORDER — IBUPROFEN 400 MG/1
800 TABLET, FILM COATED ORAL
Status: COMPLETED | OUTPATIENT
Start: 2017-12-02 | End: 2017-12-03

## 2017-12-02 RX ORDER — CARBOPROST TROMETHAMINE 250 UG/ML
250 INJECTION, SOLUTION INTRAMUSCULAR
Status: DISCONTINUED | OUTPATIENT
Start: 2017-12-02 | End: 2017-12-05 | Stop reason: HOSPADM

## 2017-12-02 RX ORDER — OXYTOCIN 10 [USP'U]/ML
10 INJECTION, SOLUTION INTRAMUSCULAR; INTRAVENOUS
Status: DISCONTINUED | OUTPATIENT
Start: 2017-12-02 | End: 2017-12-05 | Stop reason: HOSPADM

## 2017-12-02 RX ORDER — MISOPROSTOL 100 UG/1
25 TABLET ORAL
Status: DISCONTINUED | OUTPATIENT
Start: 2017-12-02 | End: 2017-12-02

## 2017-12-02 RX ORDER — SODIUM CHLORIDE, SODIUM LACTATE, POTASSIUM CHLORIDE, CALCIUM CHLORIDE 600; 310; 30; 20 MG/100ML; MG/100ML; MG/100ML; MG/100ML
INJECTION, SOLUTION INTRAVENOUS CONTINUOUS
Status: DISCONTINUED | OUTPATIENT
Start: 2017-12-02 | End: 2017-12-05 | Stop reason: HOSPADM

## 2017-12-02 RX ORDER — METHYLERGONOVINE MALEATE 0.2 MG/ML
200 INJECTION INTRAVENOUS
Status: COMPLETED | OUTPATIENT
Start: 2017-12-02 | End: 2017-12-03

## 2017-12-02 RX ORDER — PENICILLIN G POTASSIUM 5000000 [IU]/1
5 INJECTION, POWDER, FOR SOLUTION INTRAMUSCULAR; INTRAVENOUS ONCE
Status: COMPLETED | OUTPATIENT
Start: 2017-12-02 | End: 2017-12-02

## 2017-12-02 RX ADMIN — Medication 25 MCG: at 19:08

## 2017-12-02 RX ADMIN — Medication 25 MCG: at 21:13

## 2017-12-02 RX ADMIN — SODIUM CHLORIDE 2.5 MILLION UNITS: 9 INJECTION, SOLUTION INTRAVENOUS at 22:10

## 2017-12-02 RX ADMIN — PENICILLIN G POTASSIUM 5 MILLION UNITS: 5000000 POWDER, FOR SOLUTION INTRAMUSCULAR; INTRAPLEURAL; INTRATHECAL; INTRAVENOUS at 18:14

## 2017-12-02 NOTE — IP AVS SNAPSHOT
[address]    [place] [address]    Phone:  [phone]                                       After Visit Summary   12/2/2017    Maddy Anglin    MRN: [id number]           After Visit Summary Signature Page     I have received my discharge instructions, and my questions have been answered. I have discussed any challenges I see with this plan with the nurse or doctor.    ..........................................................................................................................................  Patient/Patient Representative Signature      ..........................................................................................................................................  Patient Representative Print Name and Relationship to Patient    ..................................................               ................................................  Date                                            Time    ..........................................................................................................................................  Reviewed by Signature/Title    ...................................................              ..............................................  Date                                                            Time

## 2017-12-02 NOTE — TELEPHONE ENCOUNTER
"38 wk 2 day, KITA , . OB provider: Dr. Hanna @[facility]. \"I think my water just broke\". Had 2 gushes of clear fluid from vagina. Fluid slightly pink at first then clear. No other bleeding.  No contractions, back pain or abdominal pain at this time. Feeling well. Baby moving like usual today but has not paid much attention to fetal movement over past 1 hour so pt not sure. Advised pt we are paging the on-call OB doctor to call her. Advised lie on L side and have a glass of juice or other sugary clear liquid and feel for fetal movement while awaiting provider call. Call back if no call from doctor in 20-30 min or if new or changing sx. Pt voiced understanding and agreement. Dr. Smith paged @3:19pm to call pt at [phone]. Marion Jeff RN/FNA      Reason for Disposition    Leakage of fluid from vagina    Additional Information    Negative: [1] SEVERE abdominal pain (e.g., excruciating) AND [2] constant AND [3] present > 1 hour    Negative: Severe bleeding (e.g., continuous red blood from vagina, or large blood clots)    Negative: Umbilical cord hanging out of the vagina (shiny, white, curled appearance, \"like telephone cord\")    Negative: Uncontrollable urge to push (i.e., feels like baby is coming out now)    Negative: Can see baby    Negative: Sounds like a life-threatening emergency to the triager    Negative: < 20 weeks pregnant    Negative: Vaginal bleeding    Protocols used: PREGNANCY - RUPTURE OF MEMBRANES-ADULT-    "

## 2017-12-02 NOTE — IP AVS SNAPSHOT
MRN:[id number]                      After Visit Summary   12/2/2017    Maddy Anglin    MRN: [id number]           Thank you!     Thank you for choosing [place] for your care. Our goal is always to provide you with excellent care. Hearing back from our patients is one way we can continue to improve our services. Please take a few minutes to complete the written survey that you may receive in the mail after you visit with us. Thank you!        Patient Information     Date Of Birth          1985        About your hospital stay     You were admitted on:  December 2, 2017 You last received care in the:  [address]    You were discharged on:  December 5, 2017       Who to Call     For medical emergencies, please call 911.  For non-urgent questions about your medical care, please call your primary care provider or clinic, [phone]          Attending Provider     Provider Specialty    Ana Smith MD OB/Gyn    Masters, Stephanie Banerjee DO OB/Gyn       Primary Care Provider Office Phone # Fax #    Luna Jiang [phone]      Your next 10 appointments already scheduled     Dec 07, 2017  4:10 PM CST   ESTABLISHED PRENATAL with Stephanie Hanna DO   [facility] [place] ([facility] [place])    [address] [place] [id number] [phone]            Dec 14, 2017  3:20 PM CST   ESTABLISHED PRENATAL with Stephanie Hanna DO   [facility] [place] ([facility] [place])    [address]  [place] [facility] [place] [address]   [phone]              Further instructions from your care team       DISCHARGE INSTRUCTIONS    Medications:  -May take Ibuprofen (800mg by mouth every 8 hours as needed for pain) or tylenol (500mg by mouth every 6 hours as needed for pain; max 4000mg per day) when at home as needed for pain/cramps/headaches/pain,  "follow instructions on bottle.  -You may use miralax (daily) or docusate (one tab by mouth twice daily) for stool softening, these are over the counter and can be found at any pharmacy. Follow bottle instructions.  -Continue prenatal vitamins.     Activity:  -Pelvic rest (no sex, tampons) for 6 weeks.   -General activity as tolerated, slowing increase daily. Avoid vigorous exercise, jumping or strength training for at least 4-6 weeks. You may drive when you are able to safely operate a motor vehicle and are no longer taking narcotic medications if they have been prescribed for you.    Precuations:  -Call doctor if heavy unexpected bleeding, fever of 100.4 or greater, foul vaginal discharge, severe abdominal pain not helped with medications or for any other concerns or questions. If you are having headaches not resolved by tylenol or ibuprofen, new or different vision changes then notify your doctor.    Follow Up Visit:  -Call the [facility] to schedule your 6 week postpartum appointment, [phone].             Pending Results     No orders found from 11/30/2017 to 12/3/2017.            Statement of Approval     Ordered          12/05/17 0958  I have reviewed and agree with all the recommendations and orders detailed in this document.  EFFECTIVE NOW     Approved and electronically signed by:  Stephanie Hanna DO             Admission Information     Date & Time Provider Department Dept. Phone    12/2/2017 Stephanie Hanna,  [address] [phone]      Your Vitals Were     Blood Pressure Pulse Temperature Respirations Height Weight    124/79 76 98.1  F (36.7  C) (Oral) 16 1.727 m (5' 8\") 98.9 kg (218 lb)    Last Period Pulse Oximetry BMI (Body Mass Index)             03/09/2017 97% 33.15 kg/m2         [employer] Information     [employer] gives you secure access to your electronic health record. If you see a primary care provider, you can also send messages to " your care team and make appointments. If you have questions, please call your primary care clinic.  If you do not have a primary care provider, please call [phone] and they will assist you.        Care EveryWhere ID     This is your Care EveryWhere ID. This could be used by other organizations to access your [place] medical records  [id number]        Equal Access to Services     [facility] [place] : Hadmarcy branch Soana, waaxda joel, qaybta isaac fitzgerald, bhavin reese . So [facility] [phone].    ATENCIÓN: Si habla español, tiene a corado disposición servicios gratuitos de asistencia lingüística. Llame al [phone].    We comply with applicable federal civil rights laws and [place] laws. We do not discriminate on the basis of race, color, national origin, age, disability, sex, sexual orientation, or gender identity.               Review of your medicines      CONTINUE these medicines which have NOT CHANGED        Dose / Directions    PRENATAL VITAMIN PO   Used for:  Encounter for supervision of normal first pregnancy in second trimester        Refills:  0       TYLENOL PO        Dose:  500 mg   Take 500 mg by mouth as needed for mild pain or fever   Refills:  0                Protect others around you: Learn how to safely use, store and throw away your medicines at www.disposemymeds.org.             Medication List: This is a list of all your medications and when to take them. Check marks below indicate your daily home schedule. Keep this list as a reference.      Medications           Morning Afternoon Evening Bedtime As Needed    PRENATAL VITAMIN PO                                TYLENOL PO   Take 500 mg by mouth as needed for mild pain or fever   Last time this was given:  650 mg on 12/5/2017 10:06 AM

## 2017-12-03 ENCOUNTER — ANESTHESIA (OUTPATIENT)
Dept: OBGYN | Facility: CLINIC | Age: 32
End: 2017-12-03
Payer: COMMERCIAL

## 2017-12-03 ENCOUNTER — ANESTHESIA EVENT (OUTPATIENT)
Dept: OBGYN | Facility: CLINIC | Age: 32
End: 2017-12-03
Payer: COMMERCIAL

## 2017-12-03 PROBLEM — O42.90 RUPTURE, MEMBRANES, PREMATURE: Status: ACTIVE | Noted: 2017-12-03

## 2017-12-03 LAB
BLOOD BANK CMNT PATIENT-IMP: NORMAL
BLOOD BANK CMNT PATIENT-IMP: NORMAL

## 2017-12-03 PROCEDURE — 72200001 ZZH LABOR CARE VAGINAL DELIVERY SINGLE

## 2017-12-03 PROCEDURE — 25000125 ZZHC RX 250: Performed by: OBSTETRICS & GYNECOLOGY

## 2017-12-03 PROCEDURE — 12000037 ZZH R&B POSTPARTUM INTERMEDIATE

## 2017-12-03 PROCEDURE — 59400 OBSTETRICAL CARE: CPT | Performed by: OBSTETRICS & GYNECOLOGY

## 2017-12-03 PROCEDURE — 25000128 H RX IP 250 OP 636: Performed by: OBSTETRICS & GYNECOLOGY

## 2017-12-03 PROCEDURE — 25000128 H RX IP 250 OP 636: Performed by: ANESTHESIOLOGY

## 2017-12-03 PROCEDURE — 25000132 ZZH RX MED GY IP 250 OP 250 PS 637: Performed by: OBSTETRICS & GYNECOLOGY

## 2017-12-03 RX ORDER — OXYTOCIN 10 [USP'U]/ML
10 INJECTION, SOLUTION INTRAMUSCULAR; INTRAVENOUS
Status: DISCONTINUED | OUTPATIENT
Start: 2017-12-03 | End: 2017-12-05 | Stop reason: HOSPADM

## 2017-12-03 RX ORDER — LANOLIN 100 %
OINTMENT (GRAM) TOPICAL
Status: DISCONTINUED | OUTPATIENT
Start: 2017-12-03 | End: 2017-12-05 | Stop reason: HOSPADM

## 2017-12-03 RX ORDER — OXYTOCIN/0.9 % SODIUM CHLORIDE 30/500 ML
1-24 PLASTIC BAG, INJECTION (ML) INTRAVENOUS CONTINUOUS
Status: DISCONTINUED | OUTPATIENT
Start: 2017-12-03 | End: 2017-12-05 | Stop reason: HOSPADM

## 2017-12-03 RX ORDER — OXYTOCIN/0.9 % SODIUM CHLORIDE 30/500 ML
100 PLASTIC BAG, INJECTION (ML) INTRAVENOUS CONTINUOUS
Status: DISCONTINUED | OUTPATIENT
Start: 2017-12-03 | End: 2017-12-05 | Stop reason: HOSPADM

## 2017-12-03 RX ORDER — HYDROCORTISONE 2.5 %
CREAM (GRAM) TOPICAL 3 TIMES DAILY PRN
Status: DISCONTINUED | OUTPATIENT
Start: 2017-12-03 | End: 2017-12-05 | Stop reason: HOSPADM

## 2017-12-03 RX ORDER — MISOPROSTOL 200 UG/1
400 TABLET ORAL
Status: DISCONTINUED | OUTPATIENT
Start: 2017-12-03 | End: 2017-12-05 | Stop reason: HOSPADM

## 2017-12-03 RX ORDER — LIDOCAINE 40 MG/G
CREAM TOPICAL
Status: DISCONTINUED | OUTPATIENT
Start: 2017-12-03 | End: 2017-12-05 | Stop reason: HOSPADM

## 2017-12-03 RX ORDER — LIDOCAINE HYDROCHLORIDE AND EPINEPHRINE 15; 5 MG/ML; UG/ML
3 INJECTION, SOLUTION EPIDURAL
Status: COMPLETED | OUTPATIENT
Start: 2017-12-03 | End: 2017-12-03

## 2017-12-03 RX ORDER — NALOXONE HYDROCHLORIDE 0.4 MG/ML
.1-.4 INJECTION, SOLUTION INTRAMUSCULAR; INTRAVENOUS; SUBCUTANEOUS
Status: DISCONTINUED | OUTPATIENT
Start: 2017-12-03 | End: 2017-12-05 | Stop reason: HOSPADM

## 2017-12-03 RX ORDER — BISACODYL 10 MG
10 SUPPOSITORY, RECTAL RECTAL DAILY PRN
Status: DISCONTINUED | OUTPATIENT
Start: 2017-12-05 | End: 2017-12-05 | Stop reason: HOSPADM

## 2017-12-03 RX ORDER — ROPIVACAINE HYDROCHLORIDE 2 MG/ML
10 INJECTION, SOLUTION EPIDURAL; INFILTRATION; PERINEURAL ONCE
Status: COMPLETED | OUTPATIENT
Start: 2017-12-03 | End: 2017-12-03

## 2017-12-03 RX ORDER — OXYTOCIN/0.9 % SODIUM CHLORIDE 30/500 ML
340 PLASTIC BAG, INJECTION (ML) INTRAVENOUS CONTINUOUS PRN
Status: DISCONTINUED | OUTPATIENT
Start: 2017-12-03 | End: 2017-12-05 | Stop reason: HOSPADM

## 2017-12-03 RX ORDER — AMOXICILLIN 250 MG
1 CAPSULE ORAL 2 TIMES DAILY PRN
Status: DISCONTINUED | OUTPATIENT
Start: 2017-12-03 | End: 2017-12-05 | Stop reason: HOSPADM

## 2017-12-03 RX ORDER — IBUPROFEN 400 MG/1
800 TABLET, FILM COATED ORAL EVERY 6 HOURS PRN
Status: DISCONTINUED | OUTPATIENT
Start: 2017-12-03 | End: 2017-12-05 | Stop reason: HOSPADM

## 2017-12-03 RX ORDER — AMOXICILLIN 250 MG
2 CAPSULE ORAL 2 TIMES DAILY PRN
Status: DISCONTINUED | OUTPATIENT
Start: 2017-12-03 | End: 2017-12-05 | Stop reason: HOSPADM

## 2017-12-03 RX ORDER — ACETAMINOPHEN 325 MG/1
650 TABLET ORAL EVERY 4 HOURS PRN
Status: DISCONTINUED | OUTPATIENT
Start: 2017-12-03 | End: 2017-12-05 | Stop reason: HOSPADM

## 2017-12-03 RX ORDER — EPHEDRINE SULFATE 50 MG/ML
5 INJECTION, SOLUTION INTRAMUSCULAR; INTRAVENOUS; SUBCUTANEOUS
Status: DISCONTINUED | OUTPATIENT
Start: 2017-12-03 | End: 2017-12-05 | Stop reason: HOSPADM

## 2017-12-03 RX ORDER — NALBUPHINE HYDROCHLORIDE 10 MG/ML
2.5-5 INJECTION, SOLUTION INTRAMUSCULAR; INTRAVENOUS; SUBCUTANEOUS EVERY 6 HOURS PRN
Status: DISCONTINUED | OUTPATIENT
Start: 2017-12-03 | End: 2017-12-05 | Stop reason: HOSPADM

## 2017-12-03 RX ADMIN — Medication 12 ML/HR: at 01:42

## 2017-12-03 RX ADMIN — OXYTOCIN-SODIUM CHLORIDE 0.9% IV SOLN 30 UNIT/500ML 100 ML/HR: 30-0.9/5 SOLUTION at 02:46

## 2017-12-03 RX ADMIN — SODIUM CHLORIDE 2.5 MILLION UNITS: 9 INJECTION, SOLUTION INTRAVENOUS at 01:55

## 2017-12-03 RX ADMIN — SENNOSIDES AND DOCUSATE SODIUM 1 TABLET: 8.6; 5 TABLET ORAL at 08:41

## 2017-12-03 RX ADMIN — SODIUM CHLORIDE, POTASSIUM CHLORIDE, SODIUM LACTATE AND CALCIUM CHLORIDE 1000 ML: 600; 310; 30; 20 INJECTION, SOLUTION INTRAVENOUS at 01:16

## 2017-12-03 RX ADMIN — ACETAMINOPHEN 650 MG: 325 TABLET, FILM COATED ORAL at 15:08

## 2017-12-03 RX ADMIN — ROPIVACAINE HYDROCHLORIDE 10 ML: 2 INJECTION, SOLUTION EPIDURAL; INFILTRATION at 01:43

## 2017-12-03 RX ADMIN — OXYTOCIN-SODIUM CHLORIDE 0.9% IV SOLN 30 UNIT/500ML 340 ML/HR: 30-0.9/5 SOLUTION at 02:16

## 2017-12-03 RX ADMIN — IBUPROFEN 800 MG: 400 TABLET ORAL at 08:41

## 2017-12-03 RX ADMIN — IBUPROFEN 800 MG: 400 TABLET ORAL at 15:08

## 2017-12-03 RX ADMIN — METHYLERGONOVINE MALEATE 200 MCG: 0.2 INJECTION INTRAVENOUS at 02:27

## 2017-12-03 RX ADMIN — IBUPROFEN 800 MG: 400 TABLET ORAL at 20:36

## 2017-12-03 NOTE — PLAN OF CARE
Problem: Labor (Cervical Ripen, Induct, Augment) (Adult,Obstetrics,Pediatric)  Goal: Signs and Symptoms of Listed Potential Problems Will be Absent, Minimized or Managed (Labor)  Signs and symptoms of listed potential problems will be absent, minimized or managed by discharge/transition of care (reference Labor (Cervical Ripen, Induct, Augment) (Adult,Obstetrics,Pediatric) CPG).  Outcome: Therapy, progress toward functional goals as expected   1 para 0 at 38 2/7 weeks gestation here with gross ROM; clear fluid noted.  GBS positive - start penicillin.  Cytotec for ripening, cervix fingertip/60/-3, mid position but very firm.  Plans epidural for pain management.  Boyfriend present and supportive.

## 2017-12-03 NOTE — H&P
No significant change in general health status based on examination of the patient, review of Nursing Admission Database and prenatal record.    EFW: 7lb 8oz     PCN now for GBS positive    Misoprostol PO for cervical ripening     evaluation of infant for renal pyelectasis.    Ana Smith MD

## 2017-12-03 NOTE — PLAN OF CARE
Problem: Patient Care Overview  Goal: Plan of Care/Patient Progress Review  Outcome: Improving  Postpartum assessments and VS are WDL, patient is progressing as expected following vaginal delivery. BReast feeding is going well, LATCH score of 7  Patient medicated for pain, see MAR. Anticipate discharge on 12/5/17.

## 2017-12-03 NOTE — PLAN OF CARE
Problem: Patient Care Overview  Goal: Plan of Care/Patient Progress Review  Outcome: No Change  Pt arrived to floor via WC and baby in lap. Room orientation, and safety precautions reviewed. Minimal pain, declines pain meds so far. Up with SBA, voiding.

## 2017-12-03 NOTE — ANESTHESIA PROCEDURE NOTES
Peripheral nerve/Neuraxial procedure note : epidural catheter  Pre-Procedure  Performed by JOANN BEE  Location: OB      Pre-Anesthestic Checklist: patient identified, IV checked, risks and benefits discussed, informed consent and monitors and equipment checked    Timeout  Correct Patient: Yes   Correct Procedure: Yes   Correct Site: Yes   Correct Laterality: N/A   Correct Position: Yes   Site Marked: N/A   .   Procedure Documentation    .    Procedure:    Epidural catheter.  Insertion Site:L3-4  (midline approach) Injection technique: LORT saline   Local skin infiltrated with 3 mL of 1% lidocaine.  FARRAH at 5 cm     Patient Prep;povidone-iodine 7.5% surgical scrub.  .  Needle: ToCuremarky needle Needle Gauge: 17.    Needle Length (Inches) 3.5  # of attempts: 1 and # of redirects:  .   . .  Catheter threaded easily  5 cm epidural space.  10 cm at skin.   .    Assessment/Narrative  Paresthesias: No.  .  .  Aspiration negative for heme or CSF  . Test dose of 3 mL lidocaine 1.5% w/ 1:200,000 epinephrine at. Test dose negative for signs of intravascular, subdural or intrathecal injection. Comments:  Labor Epidural  No complications.

## 2017-12-03 NOTE — PLAN OF CARE
Dr. Smith called with update regarding SVE 2/80/-2, fetal heart tones, and maternal vital signs. Plan per MD is to start pitocin at 0100. Patient may have fentanyl, nitrous oxide, or an epidural. May place gonzales with epidural.

## 2017-12-03 NOTE — L&D DELIVERY NOTE
Delivery Summary    Maddy Anglin MRN# [id number]   Age: 32 year old YOB: 1985    of viable male infant in cephalic presentation. Apgars 9,9. Weight pending.    ASSESSMENT & PLAN: Routine PP care. First degree vaginal laceration repaired with figure of 8 stitch with 2-0 vicryl.        Rupture date/time:     Rupture type:  Spontaneous rupture of membranes occuring during spontaneous labor or augmentation      Delivery/Placenta Date and Time    Delivery Date:  12/3/17 Delivery Time:   2:14 AM      Labor Events and Shoulder Dystocia    Fetal Tracing Prior to Delivery:  Category 1   Shoulder dystocia present?:  Neg            Delivery (Maternal) (Provider to Complete) (267326)    Episiotomy:  None   Perineal lacerations:  None    Periurethral laceration:  bilateral Repaired?:  No   Vaginal laceration?:  Yes Repaired?:  Yes   Cervical laceration?:  No    Est. blood loss (mL):  200         Mother's Information  Mother: Maddy Anglin #[id number]    Start of Mother's Information     IO Blood Loss  17 1414 - 17 0234    Mom's I/O Activity            End of Mother's Information  Mother: Maddy Anglin #[id number]            Delivery - Provider to Complete (933878)    Delivering clinician:  ANA SMITH   Attempted Delivery Types (Choose all that apply):  Spontaneous Vaginal Delivery   Delivery Type (Choose the 1 that will go to the Birth History):  Vaginal, Spontaneous Delivery                           Placenta    Delayed Cord Clamping:  Done   Removal:  Spontaneous   Disposition:  Hospital disposal      Anesthesia    Method:  Epidural   Cervical dilation at placement:  8-10         Presentation and Position    Presentation:  Vertex   Position:  Right Occiput Anterior                    Ana Smith MD

## 2017-12-03 NOTE — PROGRESS NOTES
"PPD#0  S: Doing well. Has not slept yet    /59 (BP Location: Left arm)  Temp 97.8  F (36.6  C) (Oral)  Resp 18  Ht 1.727 m (5' 8\")  Wt 98.9 kg (218 lb)  LMP 2017  SpO2 97%  Breastfeeding? Unknown  BMI 33.15 kg/m2  GEN: NAD, sitting up in bed  CV: well perfused extremities  RESP: non-labored respirations    PPD#0 Doing well  Routine PP care  Rhogam work up ordered.   to have evaluation by Pediatrics for renal pyelectasis  Anticipate d/c to home on PPD#2    Ana Smith MD      "

## 2017-12-03 NOTE — PLAN OF CARE
Data: Maddy Anglin transferred to 0430 via wheelchair at 0430. Baby transferred via parent's arms.  Action: Receiving unit notified of transfer: Yes. Patient and family notified of room change. Report to Poly FAIRCHILD RN at 0445. Belongings sent to receiving unit. Accompanied by Registered Nurse. Oriented patient to surroundings. Call light within reach. ID bands double-checked with receiving RN.  Response: Patient tolerated transfer and is stable.

## 2017-12-03 NOTE — LACTATION NOTE
Initial Lactation visit. Hand out given. Recommend unlimited, frequent breast feedings: At least 8 - 12 times every 24 hours. Avoid pacifiers and supplementation with formula unless medically indicated. Explained benefits of holding baby skin on skin to help promote better breastfeeding outcomes. Will revisit as needed.    Mary Flowers RN IBCLC

## 2017-12-04 LAB
HGB BLD-MCNC: 12.2 G/DL (ref 11.7–15.7)
T PALLIDUM IGG+IGM SER QL: NEGATIVE

## 2017-12-04 PROCEDURE — 25000132 ZZH RX MED GY IP 250 OP 250 PS 637: Performed by: OBSTETRICS & GYNECOLOGY

## 2017-12-04 PROCEDURE — 12000037 ZZH R&B POSTPARTUM INTERMEDIATE

## 2017-12-04 PROCEDURE — 36415 COLL VENOUS BLD VENIPUNCTURE: CPT | Performed by: OBSTETRICS & GYNECOLOGY

## 2017-12-04 PROCEDURE — 85018 HEMOGLOBIN: CPT | Performed by: OBSTETRICS & GYNECOLOGY

## 2017-12-04 RX ADMIN — SENNOSIDES AND DOCUSATE SODIUM 1 TABLET: 8.6; 5 TABLET ORAL at 08:33

## 2017-12-04 RX ADMIN — SENNOSIDES AND DOCUSATE SODIUM 1 TABLET: 8.6; 5 TABLET ORAL at 21:20

## 2017-12-04 RX ADMIN — IBUPROFEN 800 MG: 400 TABLET ORAL at 02:50

## 2017-12-04 RX ADMIN — ACETAMINOPHEN 650 MG: 325 TABLET, FILM COATED ORAL at 21:21

## 2017-12-04 RX ADMIN — IBUPROFEN 800 MG: 400 TABLET ORAL at 21:21

## 2017-12-04 RX ADMIN — ACETAMINOPHEN 650 MG: 325 TABLET, FILM COATED ORAL at 01:12

## 2017-12-04 RX ADMIN — ACETAMINOPHEN 650 MG: 325 TABLET, FILM COATED ORAL at 15:28

## 2017-12-04 RX ADMIN — IBUPROFEN 800 MG: 400 TABLET ORAL at 09:22

## 2017-12-04 RX ADMIN — IBUPROFEN 800 MG: 400 TABLET ORAL at 15:27

## 2017-12-04 RX ADMIN — ACETAMINOPHEN 650 MG: 325 TABLET, FILM COATED ORAL at 09:22

## 2017-12-04 NOTE — PLAN OF CARE
Problem: Patient Care Overview  Goal: Plan of Care/Patient Progress Review  Outcome: Improving  Vss, fundus firm with scant flow. Pain managed with tylenol and ibuprofen. Breast feeding  well. Encouraged to call with needs.

## 2017-12-04 NOTE — ANESTHESIA POSTPROCEDURE EVALUATION
Patient: Maddy Anglin    * No procedures listed *    Diagnosis:* No pre-op diagnosis entered *  Diagnosis Additional Information: No value filed.    Anesthesia Type:  No value filed.    Note:  Anesthesia Post Evaluation    Patient location during evaluation: bedside  Patient participation: Able to fully participate in evaluation  Level of consciousness: awake  Pain management: adequate  Airway patency: patent  Cardiovascular status: acceptable  Respiratory status: acceptable  Hydration status: acceptable  PONV: none     Anesthetic complications: None    Comments: No anesthetic complications noted. Denies HA. Motor/Sensation intact bilaterally. Mild soreness at insertion site. Pleased with epidural management.         Last vitals:  Vitals:    12/03/17 2330 12/04/17 0828 12/04/17 1500   BP: 102/64 113/72 105/69   Resp: 18  16   Temp: 36.5  C (97.7  F) 36.6  C (97.8  F) 36.5  C (97.7  F)   SpO2:            Electronically Signed By: Hung Morales DO, DO  December 4, 2017  3:53 PM

## 2017-12-04 NOTE — PROGRESS NOTES
Obstetrics Postpartum Rounding Note, PPD#1    S: Doing well. Pain controlled. Breast feeding. Minimal lochia. Baby getting circumcised      O:   Vitals:    12/03/17 0745 12/03/17 1600 12/03/17 2330 12/04/17 0828   BP: 104/59 107/70 102/64 113/72   BP Location: Left arm Left arm     Resp: 18 18 18    Temp: 97.8  F (36.6  C) 98.3  F (36.8  C) 97.7  F (36.5  C) 97.8  F (36.6  C)   TempSrc: Oral Oral Oral Oral   SpO2:       Weight:       Height:           Gen: AOx3, NAD  Abd: soft, ND, non ttp, FF@ U-1.   Ext: no calf ttp, No edema    Labs:         Hemoglobin   Date Value Ref Range Status   12/04/2017 12.2 11.7 - 15.7 g/dL Final   09/22/2017 11.5 (L) 11.7 - 15.7 g/dL Final            Lab Results   Component Value Date    RH Neg 12/02/2017       Meds:  Current Facility-Administered Medications   Medication     lidocaine 1 % 1 mL     lidocaine (LMX4) cream     sodium chloride (PF) 0.9% PF flush 3 mL     sodium chloride (PF) 0.9% PF flush 3 mL     oxytocin (PITOCIN) 30 units in 500 mL 0.9% NaCl infusion     medication instruction     lactated ringers BOLUS 250 mL     ePHEDrine injection 5 mg     nalbuphine (NUBAIN) injection 2.5-5 mg     naloxone (NARCAN) injection 0.1-0.4 mg     medication instruction     Opioid plan postpartum - medication instruction     fentaNYL (SUBLIMAZE) 2 mcg/mL, ropivacaine (NAROPIN) 0.2% in NaCl 0.9% EPIDURAL infusion     oxytocin (PITOCIN) 30 units in 500 mL 0.9% NaCl infusion     ibuprofen (ADVIL/MOTRIN) tablet 800 mg     acetaminophen (TYLENOL) tablet 650 mg     naloxone (NARCAN) injection 0.1-0.4 mg     senna-docusate (SENOKOT-S;PERICOLACE) 8.6-50 MG per tablet 1 tablet    Or     senna-docusate (SENOKOT-S;PERICOLACE) 8.6-50 MG per tablet 2 tablet     [START ON 12/5/2017] bisacodyl (DULCOLAX) Suppository 10 mg     [START ON 12/5/2017] sodium phosphate (FLEET ENEMA) 1 enema     hydrocortisone 2.5 % cream     lanolin ointment     lactated ringers BOLUS 1,000 mL     oxytocin (PITOCIN) 30 units in  "500 mL 0.9% NaCl infusion     oxytocin (PITOCIN) injection 10 Units     misoprostol (CYTOTEC) tablet 400 mcg     Blood Bank will determine if patient is eligible for and the proper dosage of Rho (D) immune globulin (RhoGam)     No MMR Needed - Assessment: Patient does not need MMR vaccine     No Tdap Needed - Assessment: Patient does not need Tdap vaccine     NO Rho (D)  immune globulin (RhoGam) needed  - mother INELIGIBLE     lactated ringers infusion     lactated ringers BOLUS 500 mL     acetaminophen (TYLENOL) tablet 650 mg     naloxone (NARCAN) injection 0.1-0.4 mg     ondansetron (ZOFRAN) injection 4 mg     oxytocin (PITOCIN) injection 10 Units     Medication Instructions: misoprostol (CYTOTEC)- Nurse to discuss ordering with provider, if needed. Ordered via \"OB misoprostol (CYTOTEC) Postpartum Hemorrhage PANEL\"     carboprost (HEMABATE) injection 250 mcg     lidocaine 1 % 0.1-20 mL     oxyCODONE-acetaminophen (PERCOCET) 5-325 MG per tablet 1 tablet     nitrous oxide/oxygen 50/50 blend     fentaNYL (PF) (SUBLIMAZE) injection  mcg     terbutaline (BRETHINE) injection 0.25 mg     hydrOXYzine (ATARAX) tablet 50 mg       A/P: PPD#1 s/p  doing well.  -Continue routine care.  -Anticipate discharge tomorrow    Stephanie Banerjee Masters, DO  2017      "

## 2017-12-04 NOTE — PROGRESS NOTES
"  Patient is progressing well. VSS /70 (BP Location: Left arm)  Temp 98.3  F (36.8  C) (Oral)  Resp 18  Ht 1.727 m (5' 8\")  Wt 98.9 kg (218 lb)  LMP 03/09/2017  SpO2 97%  Breastfeeding? Yes  BMI 33.15 kg/m2. Up in room independently, voiding well. Fundus is firm with scant flow. Attentive to infant. Family at bedside -- supportive. Will cont to monitor.   "

## 2017-12-04 NOTE — PLAN OF CARE
Problem: Postpartum (Vaginal Delivery) (Adult,Obstetrics,Pediatric)  Goal: Signs and Symptoms of Listed Potential Problems Will be Absent, Minimized or Managed (Postpartum)  Signs and symptoms of listed potential problems will be absent, minimized or managed by discharge/transition of care (reference Postpartum (Vaginal Delivery) (Adult,Obstetrics,Pediatric) CPG).   Outcome: Improving  VSS, pain managed with tylenol and ibuprofen. Up ind, yaima diet. Plan to call insurance for Breast pump and circumcision.

## 2017-12-05 VITALS
OXYGEN SATURATION: 97 % | HEART RATE: 76 BPM | TEMPERATURE: 98.1 F | RESPIRATION RATE: 16 BRPM | DIASTOLIC BLOOD PRESSURE: 79 MMHG | HEIGHT: 68 IN | BODY MASS INDEX: 33.04 KG/M2 | SYSTOLIC BLOOD PRESSURE: 124 MMHG | WEIGHT: 218 LBS

## 2017-12-05 PROCEDURE — 25000132 ZZH RX MED GY IP 250 OP 250 PS 637: Performed by: OBSTETRICS & GYNECOLOGY

## 2017-12-05 RX ADMIN — IBUPROFEN 800 MG: 400 TABLET ORAL at 02:59

## 2017-12-05 RX ADMIN — ACETAMINOPHEN 650 MG: 325 TABLET, FILM COATED ORAL at 03:00

## 2017-12-05 RX ADMIN — ACETAMINOPHEN 650 MG: 325 TABLET, FILM COATED ORAL at 10:06

## 2017-12-05 RX ADMIN — IBUPROFEN 800 MG: 400 TABLET ORAL at 10:06

## 2017-12-05 NOTE — PLAN OF CARE
Problem: Patient Care Overview  Goal: Plan of Care/Patient Progress Review  Outcome: Adequate for Discharge Date Met: 12/05/17  Data: Vital signs within normal limits. Postpartum checks within normal limits - see flow record. Patient eating and drinking normally. Patient able to empty bladder independently and is up ambulating. No apparent signs of infection. Episiotomy healing well. Patient performing self cares and is able to care for infant.  Action: Patient medicated during the shift for cramping. See MAR. Patient reassessed within 1 hour after each medication and pain was improved - patient stated she was comfortable. Patient education done about discharge, when to call the doctor and follow up care. See flow record.  Response: Positive attachment behaviors observed with infant. Support persons are present.   Plan: Anticipate discharge on 12/5/17.

## 2017-12-05 NOTE — PLAN OF CARE
Problem: Patient Care Overview  Goal: Plan of Care/Patient Progress Review  Outcome: Improving  VSS. Ibuprofen & Tylenol providing adequate pain control for perineal discomfort and uterine cramping.  Up ad irene. Carlyle reg diet. Breast feeding going well, baby cluster feeding.

## 2017-12-05 NOTE — PLAN OF CARE
Problem: Patient Care Overview  Goal: Plan of Care/Patient Progress Review  Outcome: Improving  VSS.  Pain well controlled, requesting prn pain medications as needed.  Up independently in room.  Working on breastfeeding  and  cares. Plan to discharge today.  On pathway. Continue to monitor and notify MD as needed.

## 2017-12-05 NOTE — DISCHARGE INSTRUCTIONS
DISCHARGE INSTRUCTIONS    Medications:  -May take Ibuprofen (800mg by mouth every 8 hours as needed for pain) or tylenol (500mg by mouth every 6 hours as needed for pain; max 4000mg per day) when at home as needed for pain/cramps/headaches/pain, follow instructions on bottle.  -You may use miralax (daily) or docusate (one tab by mouth twice daily) for stool softening, these are over the counter and can be found at any pharmacy. Follow bottle instructions.  -Continue prenatal vitamins.     Activity:  -Pelvic rest (no sex, tampons) for 6 weeks.   -General activity as tolerated, slowing increase daily. Avoid vigorous exercise, jumping or strength training for at least 4-6 weeks. You may drive when you are able to safely operate a motor vehicle and are no longer taking narcotic medications if they have been prescribed for you.    Precuations:  -Call doctor if heavy unexpected bleeding, fever of 100.4 or greater, foul vaginal discharge, severe abdominal pain not helped with medications or for any other concerns or questions. If you are having headaches not resolved by tylenol or ibuprofen, new or different vision changes then notify your doctor.    Follow Up Visit:  -Call the [facility] to schedule your 6 week postpartum appointment, [phone].

## 2017-12-05 NOTE — PROGRESS NOTES
Obstetrics Postpartum Rounding Note, PPD#2    S: Doing well. Pain controlled. Breast feeding. Minimal lochia. Ready for discharge    O:   Vitals:    12/03/17 2330 12/04/17 0828 12/04/17 1500 12/05/17 0015   BP: 102/64 113/72 105/69 115/73   BP Location:       Pulse:    76   Resp: 18  16 16   Temp: 97.7  F (36.5  C) 97.8  F (36.6  C) 97.7  F (36.5  C) 98  F (36.7  C)   TempSrc: Oral Oral Oral Oral   SpO2:       Weight:       Height:           Gen: AOx3, NAD  Abd: soft, ND, non ttp, FF@ U-1.   Ext: no calf ttp, No edema    Labs:         Hemoglobin   Date Value Ref Range Status   12/04/2017 12.2 11.7 - 15.7 g/dL Final   09/22/2017 11.5 (L) 11.7 - 15.7 g/dL Final            Lab Results   Component Value Date    RH Neg 12/02/2017       Meds:  Current Facility-Administered Medications   Medication     lidocaine 1 % 1 mL     lidocaine (LMX4) cream     sodium chloride (PF) 0.9% PF flush 3 mL     oxytocin (PITOCIN) 30 units in 500 mL 0.9% NaCl infusion     medication instruction     lactated ringers BOLUS 250 mL     ePHEDrine injection 5 mg     nalbuphine (NUBAIN) injection 2.5-5 mg     naloxone (NARCAN) injection 0.1-0.4 mg     medication instruction     Opioid plan postpartum - medication instruction     fentaNYL (SUBLIMAZE) 2 mcg/mL, ropivacaine (NAROPIN) 0.2% in NaCl 0.9% EPIDURAL infusion     oxytocin (PITOCIN) 30 units in 500 mL 0.9% NaCl infusion     ibuprofen (ADVIL/MOTRIN) tablet 800 mg     acetaminophen (TYLENOL) tablet 650 mg     naloxone (NARCAN) injection 0.1-0.4 mg     senna-docusate (SENOKOT-S;PERICOLACE) 8.6-50 MG per tablet 1 tablet    Or     senna-docusate (SENOKOT-S;PERICOLACE) 8.6-50 MG per tablet 2 tablet     bisacodyl (DULCOLAX) Suppository 10 mg     sodium phosphate (FLEET ENEMA) 1 enema     hydrocortisone 2.5 % cream     lanolin ointment     lactated ringers BOLUS 1,000 mL     oxytocin (PITOCIN) 30 units in 500 mL 0.9% NaCl infusion     oxytocin (PITOCIN) injection 10 Units     misoprostol (CYTOTEC)  "tablet 400 mcg     Blood Bank will determine if patient is eligible for and the proper dosage of Rho (D) immune globulin (RhoGam)     No MMR Needed - Assessment: Patient does not need MMR vaccine     No Tdap Needed - Assessment: Patient does not need Tdap vaccine     NO Rho (D)  immune globulin (RhoGam) needed  - mother INELIGIBLE     lactated ringers infusion     lactated ringers BOLUS 500 mL     acetaminophen (TYLENOL) tablet 650 mg     naloxone (NARCAN) injection 0.1-0.4 mg     ondansetron (ZOFRAN) injection 4 mg     oxytocin (PITOCIN) injection 10 Units     Medication Instructions: misoprostol (CYTOTEC)- Nurse to discuss ordering with provider, if needed. Ordered via \"OB misoprostol (CYTOTEC) Postpartum Hemorrhage PANEL\"     carboprost (HEMABATE) injection 250 mcg     lidocaine 1 % 0.1-20 mL     oxyCODONE-acetaminophen (PERCOCET) 5-325 MG per tablet 1 tablet     nitrous oxide/oxygen 50/50 blend     fentaNYL (PF) (SUBLIMAZE) injection  mcg     terbutaline (BRETHINE) injection 0.25 mg     hydrOXYzine (ATARAX) tablet 50 mg       A/P: PPD#2 s/p  doing well.  -Continue routine care.  -Discharge today. Precautions reviewed. F/U 6wk PP    Stephanie Banerjee Masters, DO    "

## 2017-12-18 NOTE — PROGRESS NOTES
No loss of fluid/vaginal bleeding/regular contractions. + FM  -Reviewed US, pyelectasis 6.4mm b/l. Nl fluid. Will plan to f/u with peds  - Labor precautions. F/U 2wk    Stephanie Mckenzies, DO

## 2018-01-26 ENCOUNTER — PRENATAL OFFICE VISIT (OUTPATIENT)
Dept: OBGYN | Facility: CLINIC | Age: 33
End: 2018-01-26
Payer: COMMERCIAL

## 2018-01-26 VITALS
BODY MASS INDEX: 29.67 KG/M2 | WEIGHT: 195.8 LBS | SYSTOLIC BLOOD PRESSURE: 112 MMHG | DIASTOLIC BLOOD PRESSURE: 62 MMHG | HEIGHT: 68 IN

## 2018-01-26 PROCEDURE — 99207 ZZC POST PARTUM EXAM: CPT | Performed by: OBSTETRICS & GYNECOLOGY

## 2018-01-26 ASSESSMENT — ANXIETY QUESTIONNAIRES
2. NOT BEING ABLE TO STOP OR CONTROL WORRYING: NOT AT ALL
IF YOU CHECKED OFF ANY PROBLEMS ON THIS QUESTIONNAIRE, HOW DIFFICULT HAVE THESE PROBLEMS MADE IT FOR YOU TO DO YOUR WORK, TAKE CARE OF THINGS AT HOME, OR GET ALONG WITH OTHER PEOPLE: NOT DIFFICULT AT ALL
3. WORRYING TOO MUCH ABOUT DIFFERENT THINGS: NOT AT ALL
1. FEELING NERVOUS, ANXIOUS, OR ON EDGE: NOT AT ALL
6. BECOMING EASILY ANNOYED OR IRRITABLE: NOT AT ALL
5. BEING SO RESTLESS THAT IT IS HARD TO SIT STILL: NOT AT ALL
GAD7 TOTAL SCORE: 0
7. FEELING AFRAID AS IF SOMETHING AWFUL MIGHT HAPPEN: NOT AT ALL

## 2018-01-26 ASSESSMENT — PATIENT HEALTH QUESTIONNAIRE - PHQ9: 5. POOR APPETITE OR OVEREATING: NOT AT ALL

## 2018-01-26 NOTE — MR AVS SNAPSHOT
After Visit Summary   1/26/2018    Maddy Anglin    MRN: [id number]           Patient Information     Date Of Birth          1985        Visit Information        Provider Department      1/26/2018 10:40 AM Stephanie Hanna DO [facility]        Today's Diagnoses     Routine postpartum follow-up    -  1       Follow-ups after your visit        Follow-up notes from your care team     Return in about 1 year (around 1/26/2019).      Your next 10 appointments already scheduled     Feb 01, 2018 11:30 AM CST   Office Visit with Stephanie Hanna DO   [facility] ([facility])    [address] [place] [id number] [phone]           Bring a current list of meds and any records pertaining to this visit. For Physicals, please bring immunization records and any forms needing to be filled out. Please arrive 10 minutes early to complete paperwork.              Who to contact     If you have questions or need follow up information about today's clinic visit or your schedule please contact [facility] directly at [phone].  Normal or non-critical lab and imaging results will be communicated to you by MyChart, letter or phone within 4 business days after the clinic has received the results. If you do not hear from us within 7 days, please contact the clinic through M-Factorhart or phone. If you have a critical or abnormal lab result, we will notify you by phone as soon as possible.  Submit refill requests through eCommHub or call your pharmacy and they will forward the refill request to us. Please allow 3 business days for your refill to be completed.          Additional Information About Your Visit        MyChart Information     eCommHub gives you secure access to your electronic health record. If you see a primary care provider, you can also send messages to your care team and make  "appointments. If you have questions, please call your primary care clinic.  If you do not have a primary care provider, please call [phone] and they will assist you.        Care EveryWhere ID     This is your Care EveryWhere ID. This could be used by other organizations to access your [place] medical records  [id number]        Your Vitals Were     Height Last Period BMI (Body Mass Index)             5' 8\" (1.727 m) 03/09/2017 29.77 kg/m2          Blood Pressure from Last 3 Encounters:   01/26/18 112/62   12/05/17 124/79   11/30/17 112/74    Weight from Last 3 Encounters:   01/26/18 195 lb 12.8 oz (88.8 kg)   12/02/17 218 lb (98.9 kg)   11/30/17 219 lb (99.3 kg)              We Performed the Following     POSTPARTUM CARE VISIT        Primary Care Provider Office Phone # Fax #    Luna Jiang [phone]       [address] [address]        Equal Access to Services     [facility]: Hadii aad ku hadasho Soomaali, waaxda luqadaha, qaybta kaalmada adeegyada, bhavin reese . So [facility] [phone].    ATENCIÓN: Si habla español, tiene a corado disposición servicios gratuitos de asistencia lingüística. Llame al [phone].    We comply with applicable federal civil rights laws and [place] laws. We do not discriminate on the basis of race, color, national origin, age, disability, sex, sexual orientation, or gender identity.            Thank you!     Thank you for choosing [facility]  for your care. Our goal is always to provide you with excellent care. Hearing back from our patients is one way we can continue to improve our services. Please take a few minutes to complete the written survey that you may receive in the mail after your visit with us. Thank you!             Your Updated Medication List - Protect others around you: Learn how to safely use, store and throw away your medicines at www.disposemymeds.org.          This " list is accurate as of 1/26/18 11:59 PM.  Always use your most recent med list.                   Brand Name Dispense Instructions for use Diagnosis    IBUPROFEN PO       Routine postpartum follow-up       PRENATAL VITAMIN PO       Encounter for supervision of normal first pregnancy in second trimester

## 2018-01-26 NOTE — PROGRESS NOTES
"  SUBJECTIVE:  Maddy Anglin,  is here for a postpartum visit.  She had a  on 12/3/17 delivering a healthy baby boy, named Ulysses weighing 7 lbs 3.7 oz at term.      HPI:  Baby doing pretty well sleeping, getting better.  Mood good. Goes back to work in 3wks.     Problem list from preg:  EDC 17 by L=10wk US Innatal: Neg.  Male  Migraines  Anxiety  Rh Neg [  ]Rhogam 28wk  drinking/smoking/plan b beginning preg  Don hip dysp-notify peds  Pyelectasis 5mm b/l. 32wk b/l 6.4mm. F/U with peds  FLU: 10/26/17 TDAP 2017  GBS POS     Last PHQ-9 score on record=   PHQ-9 SCORE 2018   Total Score -   Total Score 1     SHARRI-7 SCORE 2017   Total Score 2 0       Delivery complications:  No  Breast feeding:  Yes  Bladder problems:  No  Bowel problems/hemorrhoids:  Yes, some hemorrhoids  Episiotomy/laceration/incision healed? Yes: one stitch, first degree  Vaginal flow:  None, light bleeding today  Ozora:  No  Contraception: unsure, had migraines with ocp  Emotional adjustment:  doing well, happy and tired  Back to work: 17    12 point review of systems negative other than symptoms noted below.    OBJECTIVE:  Vitals: /62  Ht 5' 8\" (1.727 m)  Wt 195 lb 12.8 oz (88.8 kg)  LMP 2017  BMI 29.77 kg/m2  BMI= Body mass index is 29.77 kg/(m^2).  General - pleasant female in no acute distress.  Breast -  symmetric, no skin changes and no puckering  Abdomen - soft, NT, ND  Pelvic - EG: normal adult female, BUS: within normal limits, Vagina: well rugated, no discharge, Cervix: no lesions or CMT  Rectovaginal - deferred.    ASSESSMENT:    ICD-10-CM    1. Routine postpartum follow-up Z39.2 IBUPROFEN PO     POSTPARTUM CARE VISIT       PLAN:  May resume normal activities without restrictions.  Pap smear was not done today.  Full counseling was provided, and all questions were answered.   Return to clinic in one year for an annual visit.   Discussed contraceptive " options, desires IUD. Reviewed risks and benefits    Stephanie Banerjee Masters, DO

## 2018-01-27 ASSESSMENT — PATIENT HEALTH QUESTIONNAIRE - PHQ9: SUM OF ALL RESPONSES TO PHQ QUESTIONS 1-9: 1

## 2018-01-27 ASSESSMENT — ANXIETY QUESTIONNAIRES: GAD7 TOTAL SCORE: 0

## 2018-01-28 PROBLEM — Z34.00 SUPERVISION OF NORMAL IUP (INTRAUTERINE PREGNANCY) IN PRIMIGRAVIDA: Status: RESOLVED | Noted: 2017-05-18 | Resolved: 2018-01-28

## 2018-01-28 PROBLEM — O42.90 RUPTURE, MEMBRANES, PREMATURE: Status: RESOLVED | Noted: 2017-12-03 | Resolved: 2018-01-28

## 2018-01-28 PROBLEM — B95.1 POSITIVE GBS TEST: Status: RESOLVED | Noted: 2017-11-20 | Resolved: 2018-01-28

## 2018-02-01 ENCOUNTER — OFFICE VISIT (OUTPATIENT)
Dept: OBGYN | Facility: CLINIC | Age: 33
End: 2018-02-01
Payer: COMMERCIAL

## 2018-02-01 DIAGNOSIS — Z30.430 ENCOUNTER FOR IUD INSERTION: Primary | ICD-10-CM

## 2018-02-01 PROCEDURE — 58300 INSERT INTRAUTERINE DEVICE: CPT | Performed by: OBSTETRICS & GYNECOLOGY

## 2018-02-01 NOTE — MR AVS SNAPSHOT
After Visit Summary   2/1/2018    Maddy Anglin    MRN: [id number]           Patient Information     Date Of Birth          1985        Visit Information        Provider Department      2/1/2018 11:30 AM Stephanie Hanna,  [facility]        Today's Diagnoses     Encounter for IUD insertion    -  1       Follow-ups after your visit        Follow-up notes from your care team     Return in about 4 weeks (around 3/1/2018).      Who to contact     If you have questions or need follow up information about today's clinic visit or your schedule please contact [facility] directly at [phone].  Normal or non-critical lab and imaging results will be communicated to you by MyChart, letter or phone within 4 business days after the clinic has received the results. If you do not hear from us within 7 days, please contact the clinic through CASTThart or phone. If you have a critical or abnormal lab result, we will notify you by phone as soon as possible.  Submit refill requests through Ubisense or call your pharmacy and they will forward the refill request to us. Please allow 3 business days for your refill to be completed.          Additional Information About Your Visit        MyChart Information     Ubisense gives you secure access to your electronic health record. If you see a primary care provider, you can also send messages to your care team and make appointments. If you have questions, please call your primary care clinic.  If you do not have a primary care provider, please call [phone] and they will assist you.        Care EveryWhere ID     This is your Care EveryWhere ID. This could be used by other organizations to access your [place] medical records  [id number]         Blood Pressure from Last 3 Encounters:   01/26/18 112/62   12/05/17 124/79   11/30/17 112/74    Weight from Last 3 Encounters:   01/26/18 195 lb 12.8 oz (88.8 kg)    12/02/17 218 lb (98.9 kg)   11/30/17 219 lb (99.3 kg)              We Performed the Following     HC LEVONORGESTREL IU 52MG 5 YR     IUD INSERTION          Today's Medication Changes          These changes are accurate as of 2/1/18 12:12 PM.  If you have any questions, ask your nurse or doctor.               Start taking these medicines.        Dose/Directions    levonorgestrel 20 MCG/24HR IUD   Commonly known as:  MIRENA (52 MG)   Used for:  Encounter for IUD insertion   Started by:  Stephanie Hanna,         Dose:  1 each   1 each (20 mcg) by Intrauterine route once for 1 dose   Quantity:  1 each   Refills:  0            Where to get your medicines      Some of these will need a paper prescription and others can be bought over the counter.  Ask your nurse if you have questions.     You don't need a prescription for these medications     levonorgestrel 20 MCG/24HR IUD                Primary Care Provider Office Phone # Fax #    Luna Jiang [phone]       [facility] [address] [address]        Equal Access to Services     [facility]: Hadii piyush dent hadfern Soana, waaxda luaviva, qaybta kaalchristiano fitzgerald, bhavin truong. So [facility] [phone].    ATENCIÓN: Si habla español, tiene a corado disposición servicios gratuitos de asistencia lingüística. [facility] [phone].    We comply with applicable federal civil rights laws and [place] laws. We do not discriminate on the basis of race, color, national origin, age, disability, sex, sexual orientation, or gender identity.            Thank you!     Thank you for choosing [facility]  for your care. Our goal is always to provide you with excellent care. Hearing back from our patients is one way we can continue to improve our services. Please take a few minutes to complete the written survey that you may receive in the mail after your visit with us. Thank you!             Your  Updated Medication List - Protect others around you: Learn how to safely use, store and throw away your medicines at www.disposemymeds.org.          This list is accurate as of 2/1/18 12:12 PM.  Always use your most recent med list.                   Brand Name Dispense Instructions for use Diagnosis    IBUPROFEN PO       Routine postpartum follow-up       levonorgestrel 20 MCG/24HR IUD    MIRENA (52 MG)    1 each    1 each (20 mcg) by Intrauterine route once for 1 dose    Encounter for IUD insertion       PRENATAL VITAMIN PO       Encounter for supervision of normal first pregnancy in second trimester

## 2018-02-01 NOTE — PROGRESS NOTES
INDICATIONS:                                                      Is a pregnancy test required: No.  Was a consent obtained?  Yes    Maddy Anglin is a 32 year old female,   who presents for insertion of an IUD. The risks, benefits and alternatives of IUD insertion were discussed in detail previously. She also has reviewed the product brochure.  She has elected to go ahead with the insertion  today and her questions were answered. Consent was signed. Her LMP is absent due to recent delivery. A pregnancy test was performed today:  No    Today's PHQ-2 Score:   PHQ-2 (  [employer]) 2016   Q1: Little interest or pleasure in doing things 0   Q2: Feeling down, depressed or hopeless 0   PHQ-2 Score 0       PROCEDURE:                                                      The pelvic exam revealed normal external genitalia. On bimanual exam the uterus was Anteverted and normal in size with no tenderness present. A speculum was inserted into the vagina and the cervix was visualized. The cervix was prepped with Betadine . The anterior lip of the cervix was grasped with a single toothed tenaculum. The uterus sounded to 7 cm. A Mirena IUD was then inserted without difficulty. The string was cut to 3 cm.  The patient experienced a mild amount of cramping.    POST PROCEDURE:                                                      She  tolerated the procedure well. There were no complications. Patient was discharged in stable condition.    Return to clinic in 4-6 weeks for IUD check.  Call if severe cramping, fever, abnormal bleeding, abnormal discharge or pelvic pain develop.  Patient was counseled about the chance of irregular bleeding.  No sex/tampons x 1wk, then condoms for contraception x 3wk.     Stephanie Banerjee Masters, DO

## 2018-03-15 ENCOUNTER — OFFICE VISIT (OUTPATIENT)
Dept: OBGYN | Facility: CLINIC | Age: 33
End: 2018-03-15
Payer: COMMERCIAL

## 2018-03-15 VITALS
SYSTOLIC BLOOD PRESSURE: 100 MMHG | DIASTOLIC BLOOD PRESSURE: 58 MMHG | BODY MASS INDEX: 29.1 KG/M2 | HEART RATE: 64 BPM | WEIGHT: 192 LBS | HEIGHT: 68 IN

## 2018-03-15 DIAGNOSIS — Z30.431 IUD CHECK UP: Primary | ICD-10-CM

## 2018-03-15 PROCEDURE — 99212 OFFICE O/P EST SF 10 MIN: CPT | Performed by: OBSTETRICS & GYNECOLOGY

## 2018-03-15 NOTE — MR AVS SNAPSHOT
"              After Visit Summary   3/15/2018    Maddy Anglin    MRN: [id number]           Patient Information     Date Of Birth          1985        Visit Information        Provider Department      3/15/2018 3:30 PM Stephanie Hanna,  [facility]        Today's Diagnoses     IUD check up    -  1       Follow-ups after your visit        Follow-up notes from your care team     Return in about 1 year (around 3/15/2019).      Who to contact     If you have questions or need follow up information about today's clinic visit or your schedule please contact [facility] directly at [phone].  Normal or non-critical lab and imaging results will be communicated to you by MyChart, letter or phone within 4 business days after the clinic has received the results. If you do not hear from us within 7 days, please contact the clinic through Vidapphart or phone. If you have a critical or abnormal lab result, we will notify you by phone as soon as possible.  Submit refill requests through Anago or call your pharmacy and they will forward the refill request to us. Please allow 3 business days for your refill to be completed.          Additional Information About Your Visit        MyChart Information     Anago gives you secure access to your electronic health record. If you see a primary care provider, you can also send messages to your care team and make appointments. If you have questions, please call your primary care clinic.  If you do not have a primary care provider, please call [phone] and they will assist you.        Care EveryWhere ID     This is your Care EveryWhere ID. This could be used by other organizations to access your [facility] medical records  [id number]        Your Vitals Were     Pulse Height Breastfeeding? BMI (Body Mass Index)          64 5' 8\" (1.727 m) Yes 29.19 kg/m2         Blood Pressure from Last 3 Encounters:   03/15/18 100/58 "   01/26/18 112/62   12/05/17 124/79    Weight from Last 3 Encounters:   03/15/18 192 lb (87.1 kg)   01/26/18 195 lb 12.8 oz (88.8 kg)   12/02/17 218 lb (98.9 kg)              Today, you had the following     No orders found for display       Primary Care Provider Office Phone # Fax #    Luna Jiang [phone] [address] [address]        Equal Access to Services     [facility]: Hadii aad ku hadasho Soomaali, waaxda luqadaha, qaybta kaalmada adeegyada, waxchris reese . So [facility] [phone].    ATENCIÓN: Si habla español, tiene a corado disposición servicios gratuitos de asistencia lingüística. [facility] [phone].    We comply with applicable federal civil rights laws and [place] laws. We do not discriminate on the basis of race, color, national origin, age, disability, sex, sexual orientation, or gender identity.            Thank you!     Thank you for choosing [facility] [place]  for your care. Our goal is always to provide you with excellent care. Hearing back from our patients is one way we can continue to improve our services. Please take a few minutes to complete the written survey that you may receive in the mail after your visit with us. Thank you!             Your Updated Medication List - Protect others around you: Learn how to safely use, store and throw away your medicines at www.disposemymeds.org.          This list is accurate as of 3/15/18  4:09 PM.  Always use your most recent med list.                   Brand Name Dispense Instructions for use Diagnosis    levonorgestrel 20 MCG/24HR IUD    MIRENA (52 MG)    1 each    1 each (20 mcg) by Intrauterine route once for 1 dose    Encounter for IUD insertion       MAXALT PO      Take 10 mg by mouth        PRENATAL VITAMIN PO       Encounter for supervision of normal first pregnancy in second trimester

## 2018-03-15 NOTE — PROGRESS NOTES
SUBJECTIVE:                                                   Maddy Anglin is a 32 year old female who presents to clinic today for the following health issue(s):  Patient presents with:  IUD: Here for IUD check      Additional information: Mirena placed 18, having some daily spotting    HPI:  Doing well. Having some light spotting. Not bothered at this point. Has not checked strings.    No LMP recorded. Patient is not currently having periods (Reason: IUD)..   Patient is sexually active, .  Using IUD for contraception.    reports that she has quit smoking. She has never used smokeless tobacco.    STD testing offered?  Declined    Health maintenance updated:  yes    Today's PHQ-2 Score:   PHQ-2 (  [employer]) 2016   Q1: Little interest or pleasure in doing things 0   Q2: Feeling down, depressed or hopeless 0   PHQ-2 Score 0     Today's PHQ-9 Score:   PHQ-9 SCORE 2018   Total Score -   Total Score 1     Today's SHARRI-7 Score:   SHARRI-7 SCORE 2018   Total Score 0       Problem list and histories reviewed & adjusted, as indicated.  Additional history: as documented.    Patient Active Problem List   Diagnosis     Migraine without aura and without status migrainosus, not intractable     Anxiety     Mild major depression (H)     Fracture of distal end of radius and ulna     Past Surgical History:   Procedure Laterality Date     ENT SURGERY      wisdom teeth      ORTHOPEDIC SURGERY      hip surgery as a baby, yuniel hip      Social History   Substance Use Topics     Smoking status: Former Smoker     Smokeless tobacco: Never Used     Alcohol use Yes      Problem (# of Occurrences) Relation (Name,Age of Onset)    Alcohol/Drug (1) Paternal Grandmother    Allergies (1) Mother    Cardiovascular (2) Maternal Grandfather, Paternal Grandmother    DIABETES (2) Mother: pre diabetic, Maternal Grandmother    Gynecology (1) Mother    Hypertension (3) Maternal Grandfather, Mother, Maternal  "Grandmother    Lipids (1) Maternal Grandfather    OSTEOPOROSIS (1) Paternal Grandmother            Current Outpatient Prescriptions   Medication Sig     Rizatriptan Benzoate (MAXALT PO) Take 10 mg by mouth     levonorgestrel (MIRENA, 52 MG,) 20 MCG/24HR IUD 1 each (20 mcg) by Intrauterine route once for 1 dose     Prenatal Vit-Fe Fumarate-FA (PRENATAL VITAMIN PO)      No current facility-administered medications for this visit.      Allergies   Allergen Reactions     Nkda [No Known Drug Allergies]        ROS:  12 point review of systems negative other than symptoms noted below.  Genitourinary: Spotting  Psychiatric: Hair    OBJECTIVE:     /58  Pulse 64  Ht 5' 8\" (1.727 m)  Wt 192 lb (87.1 kg)  Breastfeeding? Yes  BMI 29.19 kg/m2  Body mass index is 29.19 kg/(m^2).    Exam:  Constitutional:  Appearance: Well nourished, well developed alert, in no acute distress  Chest:  Respiratory Effort:  Breathing unlabored  Pelvic Exam:  External Genitalia:     Normal appearance for age, no discharge present, no tenderness present, no inflammatory lesions present, color normal  Vagina:    Normal vaginal vault without central or paravaginal defects, no discharge present, no inflammatory lesions present, no masses present  Bladder:     Nontender to palpation  Urethra:   Urethral Body:  Urethra palpation normal, urethra structural support normal   Urethral Meatus:  No erythema or lesions present  Cervix:     Appearance healthy, no lesions present, nontender to palpation, no bleeding present, string present  Uterus:     Nontender to palpation, no masses present, position anteflexed, mobility: normal  Adnexa:     No adnexal tenderness present, no adnexal masses present  Perineum:     Perineum within normal limits, no evidence of trauma, no rashes or skin lesions present  Anus:     Anus within normal limits, no hemorrhoids present  Inguinal Lymph Nodes:     No lymphadenopathy present  Pubic Hair:     Normal pubic hair " distribution for age  Genitalia and Groin:     No rashes present, no lesions present, no areas of discoloration, no masses present       ASSESSMENT/PLAN:                                                        ICD-10-CM    1. IUD check up Z30.431        -Strings in place. Reviewed typical bleeding profile, when to call if bothered or having new/different symptoms. Good for 5 yrs. F/U annually for string checks and WWE. Check strings periodically.    Stephanie Banerjee Masters, [facility] [place]

## 2018-10-09 ENCOUNTER — OFFICE VISIT (OUTPATIENT)
Dept: PEDIATRICS | Facility: CLINIC | Age: 33
End: 2018-10-09
Payer: COMMERCIAL

## 2018-10-09 VITALS
OXYGEN SATURATION: 100 % | HEIGHT: 68 IN | HEART RATE: 93 BPM | BODY MASS INDEX: 30.05 KG/M2 | WEIGHT: 198.3 LBS | DIASTOLIC BLOOD PRESSURE: 70 MMHG | TEMPERATURE: 97 F | SYSTOLIC BLOOD PRESSURE: 98 MMHG

## 2018-10-09 DIAGNOSIS — J20.9 ACUTE BRONCHITIS, UNSPECIFIED ORGANISM: Primary | ICD-10-CM

## 2018-10-09 PROCEDURE — 99213 OFFICE O/P EST LOW 20 MIN: CPT | Performed by: INTERNAL MEDICINE

## 2018-10-09 RX ORDER — CODEINE PHOSPHATE AND GUAIFENESIN 10; 100 MG/5ML; MG/5ML
1-2 SOLUTION ORAL EVERY 4 HOURS PRN
Qty: 240 ML | Refills: 0 | Status: SHIPPED | OUTPATIENT
Start: 2018-10-09 | End: 2019-04-12

## 2018-10-09 NOTE — PROGRESS NOTES
SUBJECTIVE:   Maddy Anglin is a 33 year old female who presents to clinic today for the following health issues:    HPI  33-year-old non-smoker comes in with low-grade fever nonproductive cough some night sweats and chills for the past 4 days.  Her boyfriend is also ill and was recently diagnosed with pneumonia.  She gets little short of breath on climbing stairs.  No history of asthma.    Acute Illness   Acute illness concerns: Low grade fever, cough  Onset: 4 days    Fever: YES- 99.8-102     Chills/Sweats: YES    Headache (location?): YES    Sinus Pressure:no    Conjunctivitis:  no    Ear Pain: no    Rhinorrhea: no     Congestion: YES    Sore Throat: YES- from coughing     Cough: YES-productive of yellow sputum, worsening over time    Wheeze: YES    Decreased Appetite: YES    Nausea: YES    Vomiting: YES- once    Diarrhea:  no     Dysuria/Freq.: no     Fatigue/Achiness: YES    Sick/Strep Exposure: YES- boyfriend diagnosed with pneumonia and ear infection     Therapies Tried and outcome: Robitussin, ibuprofen and nyquil          Problem list and histories reviewed & adjusted, as indicated.  Additional history: as documented    Patient Active Problem List   Diagnosis     Migraine without aura and without status migrainosus, not intractable     Anxiety     Mild major depression (H)     Fracture of distal end of radius and ulna     Past Surgical History:   Procedure Laterality Date     ENT SURGERY      wisdom teeth 2015     ORTHOPEDIC SURGERY      hip surgery as a baby, yuniel hip       Social History   Substance Use Topics     Smoking status: Former Smoker     Smokeless tobacco: Never Used     Alcohol use Yes     Family History   Problem Relation Age of Onset     Cardiovascular Maternal Grandfather      Hypertension Maternal Grandfather      Lipids Maternal Grandfather      Cardiovascular Paternal Grandmother      Alcohol/Drug Paternal Grandmother      Osteoporosis Paternal Grandmother      Diabetes  "Mother      pre diabetic     Hypertension Mother      Allergies Mother      Gynecology Mother      Diabetes Maternal Grandmother      Hypertension Maternal Grandmother          Current Outpatient Prescriptions   Medication Sig Dispense Refill     guaiFENesin-codeine (ROBITUSSIN AC) 100-10 MG/5ML SOLN solution Take 5-10 mLs by mouth every 4 hours as needed for congestion 240 mL 0     levonorgestrel (MIRENA, 52 MG,) 20 MCG/24HR IUD 1 each (20 mcg) by Intrauterine route once for 1 dose 1 each 0     Prenatal Vit-Fe Fumarate-FA (PRENATAL VITAMIN PO)        Rizatriptan Benzoate (MAXALT PO) Take 10 mg by mouth       Allergies   Allergen Reactions     Nkda [No Known Drug Allergies]        Reviewed and updated as needed this visit by clinical staff  Tobacco  Allergies  Meds  Med Hx  Surg Hx  Fam Hx  Soc Hx      Reviewed and updated as needed this visit by Provider         ROS:  Constitutional, HEENT, cardiovascular, pulmonary, gi and gu systems are negative, except as otherwise noted.    OBJECTIVE:     BP 98/70 (BP Location: Right arm, Patient Position: Sitting, Cuff Size: Adult Regular)  Pulse 93  Temp 97  F (36.1  C) (Temporal)  Ht 5' 8\" (1.727 m)  Wt 198 lb 4.8 oz (89.9 kg)  SpO2 100%  BMI 30.15 kg/m2  Body mass index is 30.15 kg/(m^2).  GENERAL: healthy, alert and no distress  HENT: ear canals and TM's normal, nose and mouth without ulcers or lesions  NECK: no adenopathy, no asymmetry, masses, or scars and thyroid normal to palpation  RESP: lungs clear to auscultation - no rales, rhonchi or wheezes  CV: regular rate and rhythm, normal S1 S2, no S3 or S4, no murmur, click or rub, no peripheral edema and peripheral pulses strong    Diagnostic Test Results:  none     ASSESSMENT/PLAN:     1.  Viral tracheobronchial infection.  Advised symptomatic treatment.  Robitussin with codeine cough syrup prescribed for cough suppression.  Patient informed that clinically looks like a viral syndrome.  There was no evidence " of wheezing or rails on examination of the lungs.  Informed symptoms should gradually improve over the next week or two.  Symptoms get worse she should return.      Stephen Guadalupe MD  [facility]

## 2018-10-09 NOTE — MR AVS SNAPSHOT
After Visit Summary   10/9/2018    Maddy Anglin    MRN: [id number]           Patient Information     Date Of Birth          1985        Visit Information        Provider Department      10/9/2018 11:30 AM Stephen Guadalupe MD [facility]        Today's Diagnoses     Acute bronchitis, unspecified organism    -  1       Follow-ups after your visit        Follow-up notes from your care team     Return if symptoms worsen or fail to improve.      Who to contact     If you have questions or need follow up information about today's clinic visit or your schedule please contact [facility] directly at [phone].  Normal or non-critical lab and imaging results will be communicated to you by microDimensionshart, letter or phone within 4 business days after the clinic has received the results. If you do not hear from us within 7 days, please contact the clinic through microDimensionshart or phone. If you have a critical or abnormal lab result, we will notify you by phone as soon as possible.  Submit refill requests through Hack Upstate or call your pharmacy and they will forward the refill request to us. Please allow 3 business days for your refill to be completed.          Additional Information About Your Visit        MyChart Information     Hack Upstate gives you secure access to your electronic health record. If you see a primary care provider, you can also send messages to your care team and make appointments. If you have questions, please call your primary care clinic.  If you do not have a primary care provider, please call [phone] and they will assist you.      Hack Upstate is an electronic gateway that provides easy, online access to your medical records. With Hack Upstate, you can request a clinic appointment, read your test results, renew a prescription or communicate with your care team.     To access your existing account, please contact your [facility] or  "call [phone] for assistance.        Care EveryWhere ID     This is your Care EveryWhere ID. This could be used by other organizations to access your [place] medical records  [id number]        Your Vitals Were     Pulse Temperature Height Pulse Oximetry BMI (Body Mass Index)       93 97  F (36.1  C) (Temporal) 5' 8\" (1.727 m) 100% 30.15 kg/m2        Blood Pressure from Last 3 Encounters:   10/09/18 98/70   03/15/18 100/58   01/26/18 112/62    Weight from Last 3 Encounters:   10/09/18 198 lb 4.8 oz (89.9 kg)   03/15/18 192 lb (87.1 kg)   01/26/18 195 lb 12.8 oz (88.8 kg)              Today, you had the following     No orders found for display         Today's Medication Changes          These changes are accurate as of 10/9/18 11:51 AM.  If you have any questions, ask your nurse or doctor.               Start taking these medicines.        Dose/Directions    guaiFENesin-codeine 100-10 MG/5ML Soln solution   Commonly known as:  ROBITUSSIN AC   Used for:  Acute bronchitis, unspecified organism   Started by:  Stephen Guadalupe MD        Dose:  1-2 tsp.   Take 5-10 mLs by mouth every 4 hours as needed for congestion   Quantity:  240 mL   Refills:  0            Where to get your medicines      Some of these will need a paper prescription and others can be bought over the counter.  Ask your nurse if you have questions.     Bring a paper prescription for each of these medications     guaiFENesin-codeine 100-10 MG/5ML Soln solution                Primary Care Provider Office Phone # Fax #    Luna Jiang [phone] [address] [address]        Equal Access to Services     [place] : Trey Alcocer, samson maldonado, taty kaalmada amilcar, bhavin truong. So [facility] [phone].    ATENCIÓN: Si habla español, tiene a corado disposición servicios gratuitos de asistencia lingüística. Llame al [phone].    We comply with " applicable federal civil rights laws and [place] laws. We do not discriminate on the basis of race, color, national origin, age, disability, sex, sexual orientation, or gender identity.            Thank you!     Thank you for choosing [facility]  for your care. Our goal is always to provide you with excellent care. Hearing back from our patients is one way we can continue to improve our services. Please take a few minutes to complete the written survey that you may receive in the mail after your visit with us. Thank you!             Your Updated Medication List - Protect others around you: Learn how to safely use, store and throw away your medicines at www.disposemymeds.org.          This list is accurate as of 10/9/18 11:51 AM.  Always use your most recent med list.                   Brand Name Dispense Instructions for use Diagnosis    guaiFENesin-codeine 100-10 MG/5ML Soln solution    ROBITUSSIN AC    240 mL    Take 5-10 mLs by mouth every 4 hours as needed for congestion    Acute bronchitis, unspecified organism       levonorgestrel 20 MCG/24HR IUD    MIRENA (52 MG)    1 each    1 each (20 mcg) by Intrauterine route once for 1 dose    Encounter for IUD insertion       MAXALT PO      Take 10 mg by mouth        PRENATAL VITAMIN PO       Encounter for supervision of normal first pregnancy in second trimester

## 2019-01-02 ENCOUNTER — OFFICE VISIT (OUTPATIENT)
Dept: FAMILY MEDICINE | Facility: CLINIC | Age: 34
End: 2019-01-02
Payer: COMMERCIAL

## 2019-01-02 VITALS
SYSTOLIC BLOOD PRESSURE: 119 MMHG | HEART RATE: 106 BPM | BODY MASS INDEX: 29.5 KG/M2 | WEIGHT: 194 LBS | DIASTOLIC BLOOD PRESSURE: 81 MMHG | TEMPERATURE: 99.3 F | OXYGEN SATURATION: 99 %

## 2019-01-02 DIAGNOSIS — J02.0 STREP THROAT: ICD-10-CM

## 2019-01-02 DIAGNOSIS — J02.0 ACUTE STREPTOCOCCAL PHARYNGITIS: Primary | ICD-10-CM

## 2019-01-02 LAB
DEPRECATED S PYO AG THROAT QL EIA: NORMAL
SPECIMEN SOURCE: NORMAL

## 2019-01-02 PROCEDURE — 87081 CULTURE SCREEN ONLY: CPT | Performed by: FAMILY MEDICINE

## 2019-01-02 PROCEDURE — 87880 STREP A ASSAY W/OPTIC: CPT | Performed by: FAMILY MEDICINE

## 2019-01-02 PROCEDURE — 99213 OFFICE O/P EST LOW 20 MIN: CPT | Performed by: FAMILY MEDICINE

## 2019-01-02 RX ORDER — PREDNISONE 20 MG/1
TABLET ORAL
Qty: 6 TABLET | Refills: 0 | Status: SHIPPED | OUTPATIENT
Start: 2019-01-02 | End: 2019-04-12

## 2019-01-02 RX ORDER — AMOXICILLIN 875 MG
875 TABLET ORAL 2 TIMES DAILY
Qty: 20 TABLET | Refills: 0 | Status: SHIPPED | OUTPATIENT
Start: 2019-01-02 | End: 2019-04-12

## 2019-01-02 ASSESSMENT — PAIN SCALES - GENERAL: PAINLEVEL: NO PAIN (0)

## 2019-01-02 NOTE — PROGRESS NOTES
SUBJECTIVE:                                                    Maddy Anglin is a 33 year old female who presents to clinic today for the following health issues:  SUBJECTIVE: 33 year old female with sore throat, myalgias, swollen glands, headache and fever for 5 days or so . No history of rheumatic fever. Other symptoms: headache, severe sore throat, white spots in throat, enlarged tonsils and swollen glands.    OBJECTIVE:   Vitals as noted above.  Appears moderate distress.  Ears: normal  Oropharynx: tonsillar hypertrophy, marked erythema and exudates present  Neck: normal, supple and few small anterior cervical nodes  Lungs: chest clear to IPPA and clear to IPPA  Rapid Strep test is negative    ASSESSMENT: Streptococcal pharyngitis  (J02.0) Acute streptococcal pharyngitis  (primary encounter diagnosis)    Plan: Rapid strep screen, amoxicillin (AMOXIL) 875 MG        tablet, predniSONE (DELTASONE) 20 MG tablet  (J02.0) Strep throat  Plan: Rapid strep screen, amoxicillin (AMOXIL) 875 MG        tablet, predniSONE (DELTASONE) 20 MG tablet    Based on her history, symptoms and physical exam, she was treated.    PLAN: Per orders. Gargle, use acetaminophen or other OTC analgesic, and take Rx fully as prescribed. Call if other family members develop similar symptoms. See prn.        Acute Illness   Acute illness concerns: Fever and Severe sore throat  Onset: Friday Morning    Fever: YES- highest temp was 103    Chills/Sweats: YES    Headache (location?): no     Sinus Pressure:no    Conjunctivitis:  no    Ear Pain: YES: left    Rhinorrhea: no    Congestion: no    Sore Throat: YES     Cough: no    Wheeze: no    Decreased Appetite: yes    Nausea: no    Vomiting: no    Diarrhea:  no    Dysuria/Freq.: no    Fatigue/Achiness: YES    Sick/Strep Exposure: no     Therapies Tried and outcome: Ibuprofen and OTC throat spray; helpful    Eva Simmons MD

## 2019-01-02 NOTE — PATIENT INSTRUCTIONS
Patient Education     Pharyngitis: Strep (Presumed)    You have pharyngitis (sore throat). The healthcare staff think your sore throat is caused by streptococcus (strep) bacteria. This is often called strep throat. Strep throat can cause throat pain that is worse when swallowing, aching all over, headache, and fever. The infection is contagious. It may be spread by coughing, kissing, or touching others after touching your mouth or nose. Antibiotic medicine is given to treat the infection.  Home care    Rest at home. Drink plenty of fluids so you won t get dehydrated.    Stay home from work or school for the first 2 days of taking the antibiotics. After this time, you will not be contagious. You can then return to work or school if you are feeling better.     Take the antibiotic medicine for the full 10 days, even when you feel better. This is very important to make sure the infection is fully treated. It is also important to prevent medicine-resistant germs from growing. If you were given an antibiotic shot, no more antibiotics are needed.    You may use acetaminophen or ibuprofen to control pain or fever, unless another medicine was prescribed for this. If you have chronic liver or kidney disease or ever had a stomach ulcer or GI bleeding, talk with your healthcare provider before using these medicines.    Use throat lozenges or a throat-numbing spray to help reduce throat pain. Gargling with warm salt water can also help reduce throat pain. Dissolve 1/2 teaspoon of salt in 1 glass of warm water.     Don t eat salty or spicy foods. These can irritate the throat.  Follow-up care  Follow up with your healthcare provider or our staff if you don't get better over the next week.  When to seek medical advice  Call your healthcare provider right away if any of these occur:    Fever as directed by your healthcare provider    New or worse ear pain, sinus pain, or headache    Painful lumps in the back of neck    Stiff  neck    Lymph nodes that get larger    Can t swallow liquids, a lot of drooling, or can t open mouth wide due to throat pain    Signs of dehydration, such as very dark urine or no urine, sunken eyes, dizziness    Trouble breathing or noisy breathing    Muffled voice    New rash  Prevention  Here are steps you can take to help prevent an infection:    Keep good hand washing habits.    Don t have close contact with people who have sore throats, colds, or other upper respiratory infections.    Don t smoke, and stay away from secondhand smoke.    Stay up to date with of your vaccines.  Date Last Reviewed: 11/1/2017 2000-2018 [employer]. [address] [place] [address], [place] [address]. All rights reserved. This information is not intended as a substitute for professional medical care. Always follow your healthcare professional's instructions.

## 2019-01-03 LAB
BACTERIA SPEC CULT: NORMAL
SPECIMEN SOURCE: NORMAL

## 2019-04-12 ENCOUNTER — OFFICE VISIT (OUTPATIENT)
Dept: OBGYN | Facility: CLINIC | Age: 34
End: 2019-04-12
Payer: COMMERCIAL

## 2019-04-12 VITALS
HEIGHT: 68 IN | BODY MASS INDEX: 29.61 KG/M2 | WEIGHT: 195.4 LBS | DIASTOLIC BLOOD PRESSURE: 76 MMHG | HEART RATE: 76 BPM | SYSTOLIC BLOOD PRESSURE: 110 MMHG

## 2019-04-12 DIAGNOSIS — G43.009 MIGRAINE WITHOUT AURA AND WITHOUT STATUS MIGRAINOSUS, NOT INTRACTABLE: ICD-10-CM

## 2019-04-12 DIAGNOSIS — Z01.419 ENCOUNTER FOR GYNECOLOGICAL EXAMINATION WITHOUT ABNORMAL FINDING: Primary | ICD-10-CM

## 2019-04-12 DIAGNOSIS — Z97.5 IUD (INTRAUTERINE DEVICE) IN PLACE: ICD-10-CM

## 2019-04-12 PROCEDURE — 99395 PREV VISIT EST AGE 18-39: CPT | Performed by: OBSTETRICS & GYNECOLOGY

## 2019-04-12 RX ORDER — RIZATRIPTAN BENZOATE 10 MG/1
10 TABLET ORAL
Qty: 20 TABLET | Refills: 1 | Status: SHIPPED | OUTPATIENT
Start: 2019-04-12 | End: 2021-10-27

## 2019-04-12 ASSESSMENT — ANXIETY QUESTIONNAIRES
3. WORRYING TOO MUCH ABOUT DIFFERENT THINGS: SEVERAL DAYS
6. BECOMING EASILY ANNOYED OR IRRITABLE: NOT AT ALL
7. FEELING AFRAID AS IF SOMETHING AWFUL MIGHT HAPPEN: SEVERAL DAYS
GAD7 TOTAL SCORE: 3
IF YOU CHECKED OFF ANY PROBLEMS ON THIS QUESTIONNAIRE, HOW DIFFICULT HAVE THESE PROBLEMS MADE IT FOR YOU TO DO YOUR WORK, TAKE CARE OF THINGS AT HOME, OR GET ALONG WITH OTHER PEOPLE: NOT DIFFICULT AT ALL
1. FEELING NERVOUS, ANXIOUS, OR ON EDGE: SEVERAL DAYS
2. NOT BEING ABLE TO STOP OR CONTROL WORRYING: NOT AT ALL
5. BEING SO RESTLESS THAT IT IS HARD TO SIT STILL: NOT AT ALL

## 2019-04-12 ASSESSMENT — PATIENT HEALTH QUESTIONNAIRE - PHQ9
SUM OF ALL RESPONSES TO PHQ QUESTIONS 1-9: 3
5. POOR APPETITE OR OVEREATING: NOT AT ALL

## 2019-04-12 ASSESSMENT — MIFFLIN-ST. JEOR: SCORE: 1634.83

## 2019-04-12 NOTE — PROGRESS NOTES
Maddy is a 33 year old  female who presents for annual exam.     Besides routine health maintenance, she has no other health concerns today .    HPI:  The patient's PCP is  Physician No Ref-Primary.      Happy with Mirena IUD, checks strings. Gets maybe 4 HA per year now, better than in past.  Would like refill of maxalt fo rher migraines  Not sure when next kiddo will be, they are considering moving first.     Some exercise. No vitamins.       GYNECOLOGIC HISTORY:    No LMP recorded. (Menstrual status: IUD).  Her current contraception method is: IUD.  She  reports that she quit smoking about 2 years ago. She has never used smokeless tobacco.    Patient is sexually active.  STD testing offered?  Declined  Last PHQ-9 score on record =   PHQ-9 SCORE 2019   PHQ-9 Total Score -   PHQ-9 Total Score 3     Last GAD7 score on record =   SHARRI-7 SCORE 2019   Total Score 3     Alcohol Score = 4    HEALTH MAINTENANCE:  Cholesterol: 2013   Total= 158, Triglycerides=121, HDL=49, LDL=85  Cholesterol   Date Value Ref Range Status   2013 158 0 - 200 mg/dL Final     Comment:     LDL Cholesterol is the primary guide to therapy.   The NCEP recommends further evaluation of: patients with cholesterol greater   than 200 mg/dL if additional risk factors are present, cholesterol greater   than   240 mg/dL, triglycerides greater than 150 mg/dL, or HDL less than 40 mg/dL.      Last Mammo: NA, Result: not applicable, Next Mammo: 6 years.  Pap: 10/18/2016 NIL, HPV-  Lab Results   Component Value Date    PAP NIL 10/18/2016    PAP NIL 2013      Colonoscopy:  NA, Result: not applicable, Next Colonoscopy: 16 years.  Dexa:  NA    Health maintenance updated:  yes    HISTORY:  OB History    Para Term  AB Living   1 1 1 0 0 1   SAB TAB Ectopic Multiple Live Births   0 0 0 0 1      # Outcome Date GA Lbr Saturnino/2nd Weight Sex Delivery Anes PTL Lv   1 Term 17 38w3d 03:15 3.28 kg (7 lb 3.7 oz) M  "Vag-Spont EPI N KARTIK      Complications: GBS      Name: Ulysses      Apgar1: 9  Apgar5: 9       Patient Active Problem List   Diagnosis     Migraine without aura and without status migrainosus, not intractable     Anxiety     Mild major depression (H)     Fracture of distal end of radius and ulna     IUD (intrauterine device) in place     Past Surgical History:   Procedure Laterality Date     ENT SURGERY      wisdom teeth      ORTHOPEDIC SURGERY      hip surgery as a baby, yuniel hip      Social History     Tobacco Use     Smoking status: Former Smoker     Last attempt to quit: 2017     Years since quittin.2     Smokeless tobacco: Never Used   Substance Use Topics     Alcohol use: Yes      Problem (# of Occurrences) Relation (Name,Age of Onset)    Alcohol/Drug (1) Paternal Grandmother    Allergies (1) Mother    Cardiovascular (2) Maternal Grandfather, Paternal Grandmother    Diabetes (2) Mother: pre diabetic, Maternal Grandmother    Gynecology (1) Mother    Hypertension (3) Maternal Grandfather, Mother, Maternal Grandmother    Lipids (1) Maternal Grandfather    Osteoporosis (1) Paternal Grandmother            Current Outpatient Medications   Medication Sig     levonorgestrel (MIRENA, 52 MG,) 20 MCG/24HR IUD 1 each (20 mcg) by Intrauterine route once for 1 dose     Prenatal Vit-Fe Fumarate-FA (PRENATAL VITAMIN PO)      rizatriptan (MAXALT) 10 MG tablet Take 1 tablet (10 mg) by mouth at onset of headache for migraine     No current facility-administered medications for this visit.      Allergies   Allergen Reactions     Nkda [No Known Drug Allergies]        Past medical, surgical, social and family histories were reviewed and updated in EPIC.    ROS:   12 point review of systems negative other than symptoms noted below.  Genitourinary: Cramps  Neurologic: Headaches    EXAM:  /76   Pulse 76   Ht 1.727 m (5' 8\")   Wt 88.6 kg (195 lb 6.4 oz)   Breastfeeding? No   BMI 29.71 kg/m     BMI: Body mass " index is 29.71 kg/m .    PHYSICAL EXAM:  Constitutional:  Appearance: Well nourished, well developed, alert, in no acute distress  Neck:  Lymph Nodes:  No lymphadenopathy present    Thyroid:  Gland size normal, nontender, no nodules or masses present  on palpation  Chest:  Respiratory Effort:  Breathing unlabored  Cardiovascular:    Heart: Auscultation:  Regular rate, normal rhythm, no murmurs present  Breasts: Inspection of Breasts:  No lymphadenopathy present., Palpation of Breasts and Axillae:  No masses present on palpation, no breast tenderness., Axillary Lymph Nodes:  No lymphadenopathy present. and No nodularity, asymmetry or nipple discharge bilaterally.  Gastrointestinal:   Abdominal Examination:  Abdomen nontender to palpation, tone normal without rigidity or guarding, no masses present, umbilicus without lesions   Liver and Spleen:  No hepatomegaly present, liver nontender to palpation    Hernias:  No hernias present  Lymphatic: Lymph Nodes:  No other lymphadenopathy present  Skin:  General Inspection:  No rashes present, no lesions present, no areas of  discoloration    Genitalia and Groin:  No rashes present, no lesions present, no areas of  discoloration, no masses present  Neurologic/Psychiatric:    Mental Status:  Oriented X3     Pelvic Exam:  External Genitalia:     Normal appearance for age, no discharge present, no tenderness present, no inflammatory lesions present, color normal  Vagina:    Normal vaginal vault without central or paravaginal defects, no discharge present, no inflammatory lesions present, no masses present  Bladder:     Nontender to palpation  Urethra:   Urethral Body:  Urethra palpation normal, urethra structural support normal   Urethral Meatus:  No erythema or lesions present  Cervix:     Appearance healthy, no lesions present, nontender to palpation, no bleeding present, string present  Uterus:     Nontender to palpation, no masses present, position anteflexed, mobility:  normal  Adnexa:     No adnexal tenderness present, no adnexal masses present  Perineum:     Perineum within normal limits, no evidence of trauma, no rashes or skin lesions present  Anus:     Anus within normal limits, no hemorrhoids present  Inguinal Lymph Nodes:     No lymphadenopathy present  Pubic Hair:     Normal pubic hair distribution for age  Genitalia and Groin:     No rashes present, no lesions present, no areas of discoloration, no masses present      COUNSELING:   Reviewed preventive health counseling, as reflected in patient instructions       Regular exercise       Osteoporosis Prevention/Bone Health    BMI: Body mass index is 29.71 kg/m .  Weight management plan: Discussed healthy diet and exercise guidelines    ASSESSMENT:  33 year old female with satisfactory annual exam.    ICD-10-CM    1. Encounter for gynecological examination without abnormal finding Z01.419    2. IUD (intrauterine device) in place Z97.5    3. Migraine without aura and without status migrainosus, not intractable G43.009 rizatriptan (MAXALT) 10 MG tablet       PLAN:  -UTD for cervical cancer screening. Reviewed guidelines-pap q 3yrs until age 30 when co-testing q 5 years.  -Breast self awareness discussed. Age 40 for mammogram.  -Discussed exercise-making plan, strength training. Nutrition encouraged.  -Colonoscopy age 45-50  -Osteoporosis prevention discussed.  -IUD in place. Good until 2023.  -Refill migraine med. No issues.   -Return one year for next annual exam      Stephanie Banerjee Masters, DO

## 2019-04-13 ASSESSMENT — ANXIETY QUESTIONNAIRES: GAD7 TOTAL SCORE: 3

## 2019-10-18 ENCOUNTER — OFFICE VISIT (OUTPATIENT)
Dept: FAMILY MEDICINE | Facility: CLINIC | Age: 34
End: 2019-10-18
Payer: COMMERCIAL

## 2019-10-18 ENCOUNTER — ANCILLARY PROCEDURE (OUTPATIENT)
Dept: GENERAL RADIOLOGY | Facility: CLINIC | Age: 34
End: 2019-10-18
Attending: PHYSICIAN ASSISTANT
Payer: COMMERCIAL

## 2019-10-18 VITALS
HEIGHT: 68 IN | HEART RATE: 80 BPM | OXYGEN SATURATION: 98 % | SYSTOLIC BLOOD PRESSURE: 121 MMHG | BODY MASS INDEX: 30.01 KG/M2 | WEIGHT: 198 LBS | DIASTOLIC BLOOD PRESSURE: 88 MMHG | TEMPERATURE: 98.3 F

## 2019-10-18 DIAGNOSIS — R07.0 THROAT PAIN: ICD-10-CM

## 2019-10-18 DIAGNOSIS — R05.9 COUGH: ICD-10-CM

## 2019-10-18 DIAGNOSIS — J20.9 ACUTE BRONCHITIS WITH BRONCHOSPASM: Primary | ICD-10-CM

## 2019-10-18 LAB
DEPRECATED S PYO AG THROAT QL EIA: NORMAL
SPECIMEN SOURCE: NORMAL

## 2019-10-18 PROCEDURE — 99213 OFFICE O/P EST LOW 20 MIN: CPT | Performed by: PHYSICIAN ASSISTANT

## 2019-10-18 PROCEDURE — 71046 X-RAY EXAM CHEST 2 VIEWS: CPT | Mod: FY

## 2019-10-18 PROCEDURE — 87880 STREP A ASSAY W/OPTIC: CPT | Performed by: PHYSICIAN ASSISTANT

## 2019-10-18 PROCEDURE — 87081 CULTURE SCREEN ONLY: CPT | Performed by: PHYSICIAN ASSISTANT

## 2019-10-18 RX ORDER — CODEINE PHOSPHATE AND GUAIFENESIN 10; 100 MG/5ML; MG/5ML
1-2 SOLUTION ORAL EVERY 4 HOURS PRN
Qty: 8 OZ | Refills: 0 | Status: SHIPPED | OUTPATIENT
Start: 2019-10-18 | End: 2020-02-20

## 2019-10-18 RX ORDER — AZITHROMYCIN 250 MG/1
TABLET, FILM COATED ORAL
Qty: 6 TABLET | Refills: 0 | Status: SHIPPED | OUTPATIENT
Start: 2019-10-18 | End: 2020-02-20

## 2019-10-18 RX ORDER — PREDNISONE 20 MG/1
40 TABLET ORAL DAILY
Qty: 10 TABLET | Refills: 0 | Status: SHIPPED | OUTPATIENT
Start: 2019-10-18 | End: 2020-02-20

## 2019-10-18 RX ORDER — CODEINE PHOSPHATE AND GUAIFENESIN 10; 100 MG/5ML; MG/5ML
1-2 SOLUTION ORAL EVERY 4 HOURS PRN
Qty: 8 OZ | Refills: 0 | Status: SHIPPED | OUTPATIENT
Start: 2019-10-18 | End: 2019-10-18

## 2019-10-18 ASSESSMENT — MIFFLIN-ST. JEOR: SCORE: 1641.62

## 2019-10-18 NOTE — RESULT ENCOUNTER NOTE
Kadi Castañeda  Your chest xray was read as normal by the radiologist as well.   Please call or MyChart my office with any questions or concerns.    Eleonora Whitfield, PAC

## 2019-10-18 NOTE — PATIENT INSTRUCTIONS
Take zithromax daily for 5 days  Take prednisone daily for five days  Take robitussin with codeine up to every 4 hours as needed for cough- do not drive or work after taking  Return urgently if any change in symptoms like increasing cough, shortness of breath , fever or other change in symptoms  Follow up with us in one week if symptoms not improving

## 2019-10-18 NOTE — PROGRESS NOTES
Subjective     Maddy Anglin is a 34 year old female who presents to clinic today for the following health issues:    HPI   Acute Illness   Acute illness concerns: Cough  Onset: 10/2/19    Fever: no     Chills/Sweats: no     Headache (location?): YES    Sinus Pressure:no    Conjunctivitis:  YES: left    Ear Pain: no    Rhinorrhea: no     Congestion: YES    Sore Throat: no      Cough: YES-productive of yellow sputum    Wheeze: no     Decreased Appetite: YES    Nausea: no     Vomiting: no     Diarrhea:  YES    Dysuria/Freq.: no     Fatigue/Achiness: YES    Sick/Strep Exposure: YES     Therapies Tried and outcome: Cough Syrup, ibuprofen and Mucinex with some relief       All throughout day coughing and sore throat.    Sometimes shortness of breath but not consistent.   No fever but a few days in evening low grade temp of 100 and can usually tell feel wiped but not consistent and that was last tues  Coworkers ill  Son has had a cough for little over a week  Fatigue no body aches  Reports No history of pneumonia    Patient Active Problem List   Diagnosis     Migraine without aura and without status migrainosus, not intractable     Anxiety     Mild major depression (H)     Fracture of distal end of radius and ulna     IUD (intrauterine device) in place     Past Surgical History:   Procedure Laterality Date     ENT SURGERY      wisdom teeth 2015     ORTHOPEDIC SURGERY      hip surgery as a baby, yuniel hip       Social History     Tobacco Use     Smoking status: Former Smoker     Last attempt to quit: 2017     Years since quittin.5     Smokeless tobacco: Never Used   Substance Use Topics     Alcohol use: Yes     Family History   Problem Relation Age of Onset     Cardiovascular Maternal Grandfather      Hypertension Maternal Grandfather      Lipids Maternal Grandfather      Cardiovascular Paternal Grandmother      Alcohol/Drug Paternal Grandmother      Osteoporosis Paternal Grandmother      Diabetes Mother  "        pre diabetic     Hypertension Mother      Allergies Mother      Gynecology Mother      Diabetes Maternal Grandmother      Hypertension Maternal Grandmother      Breast Cancer No family hx of      Colon Cancer No family hx of          Current Outpatient Medications   Medication Sig Dispense Refill                   levonorgestrel (MIRENA, 52 MG,) 20 MCG/24HR IUD 1 each (20 mcg) by Intrauterine route once for 1 dose 1 each 0            Prenatal Vit-Fe Fumarate-FA (PRENATAL VITAMIN PO)        rizatriptan (MAXALT) 10 MG tablet Take 1 tablet (10 mg) by mouth at onset of headache for migraine 20 tablet 1     BP Readings from Last 3 Encounters:   10/18/19 121/88   04/12/19 110/76   01/02/19 119/81    Wt Readings from Last 3 Encounters:   10/18/19 89.8 kg (198 lb)   04/12/19 88.6 kg (195 lb 6.4 oz)   01/02/19 88 kg (194 lb)                      Reviewed and updated as needed this visit by Provider  Tobacco  Allergies  Meds  Problems  Med Hx  Surg Hx  Fam Hx  Soc Hx          Review of Systems   ROS COMP: Constitutional, HEENT, cardiovascular, pulmonary, gi and gu systems are negative, except as otherwise noted.      Objective    /88 (BP Location: Right arm, Patient Position: Chair, Cuff Size: Adult Large)   Pulse 80   Temp 98.3  F (36.8  C) (Oral)   Ht 1.727 m (5' 8\")   Wt 89.8 kg (198 lb)   LMP  (LMP Unknown)   SpO2 98%   Breastfeeding? No   BMI 30.11 kg/m    Body mass index is 30.11 kg/m .  Physical Exam   GENERAL: healthy, alert and no distress  EYES: Eyes grossly normal to inspection, PERRL and conjunctivae and sclerae normal  HENT: ear canals and TM's normal, nose and mouth without ulcers or lesions  NECK: no adenopathy, no asymmetry, masses, or scars and thyroid normal to palpation  RESP: scattered wheeze throughout. No rhonchi or rales   CV: regular rate and rhythm, normal S1 S2, no S3 or S4, no murmur, click or rub, no peripheral edema and peripheral pulses strong  MS: no gross " "musculoskeletal defects noted, no edema    Diagnostic Test Results:  Results for orders placed or performed in visit on 10/18/19   Rapid strep screen   Result Value Ref Range    Specimen Description Throat     Rapid Strep A Screen       NEGATIVE: No Group A streptococcal antigen detected by immunoassay, await culture report.   Beta strep group A culture   Result Value Ref Range    Specimen Description Throat     Culture Micro No beta hemolytic Streptococcus Group A isolated              Assessment & Plan     1. Acute bronchitis with bronchospasm  Will treat with zithromax and burst of prednisone   - azithromycin (ZITHROMAX) 250 MG tablet; Take 2 tablets (500 mg) by mouth daily for 1 day, THEN 1 tablet (250 mg) daily for 4 days.  Dispense: 6 tablet; Refill: 0  - predniSONE (DELTASONE) 20 MG tablet; Take 2 tablets (40 mg) by mouth daily for 5 days  Dispense: 10 tablet; Refill: 0    2. Throat pain  Negative strep test   - Rapid strep screen  - Beta strep group A culture    3. Cough  As needed use of codeine   - XR Chest 2 Views; Future  - guaiFENesin-codeine (ROBITUSSIN AC) 100-10 MG/5ML solution; Take 5-10 mLs by mouth every 4 hours as needed for cough Do not drive or work after taking  Dispense: 8 oz; Refill: 0     BMI:   Estimated body mass index is 30.11 kg/m  as calculated from the following:    Height as of this encounter: 1.727 m (5' 8\").    Weight as of this encounter: 89.8 kg (198 lb).   Weight management plan: Discussed healthy diet and exercise guidelines        Patient Instructions   Take zithromax daily for 5 days  Take prednisone daily for five days  Take robitussin with codeine up to every 4 hours as needed for cough- do not drive or work after taking  Return urgently if any change in symptoms like increasing cough, shortness of breath , fever or other change in symptoms  Follow up with us in one week if symptoms not improving       Return in about 1 week (around 10/25/2019), or if symptoms worsen or " fail to improve.    YEE ReesC  [facility]

## 2019-10-19 LAB
BACTERIA SPEC CULT: NORMAL
SPECIMEN SOURCE: NORMAL

## 2019-10-19 NOTE — RESULT ENCOUNTER NOTE
Kadi Castañeda  Your strep culture was negative.   Please call or MyChart my office with any questions or concerns.    Eleonora Whitfield, PAC

## 2020-02-19 NOTE — PROGRESS NOTES
INDICATIONS:                                                      Is a pregnancy test required: No.  Was a consent obtained?  Yes    Maddy Anglin is a 34 year old female,, Patient's last menstrual period was 2020 (approximate). who presents today for IUD removal. Her current IUD was placed 2018ago. She has not had problems with the IUD. She requests removal of the IUD because she desires to conceive    Today's PHQ-2 Score:   PHQ-2 (  [employer]) 2019   Q1: Little interest or pleasure in doing things 0   Q2: Feeling down, depressed or hopeless 1   PHQ-2 Score 1     For last 3mo spotting for periods. Have been progressively lightening, was 2d, light.  Wants to have IUD removed so she can start to conceive      PROCEDURE:                                                      A speculum exam was performed and the cervix was visualized. The IUD string was visualized. Using ring forceps, the string was grasped and the IUD removed intact.    POST PROCEDURE:                                                      The patient tolerated the procedure well. Patient was discharged in stable condition.    Call if bleeding, pain or fever occur., Birth control counseling given. and Pregnancy counseling given, including folic acid supplementation 800-1000 mg per day.    Stephanie Banerjee Masters, DO

## 2020-02-20 ENCOUNTER — OFFICE VISIT (OUTPATIENT)
Dept: OBGYN | Facility: CLINIC | Age: 35
End: 2020-02-20
Payer: COMMERCIAL

## 2020-02-20 VITALS
BODY MASS INDEX: 31.8 KG/M2 | SYSTOLIC BLOOD PRESSURE: 132 MMHG | WEIGHT: 209.8 LBS | DIASTOLIC BLOOD PRESSURE: 88 MMHG | HEIGHT: 68 IN

## 2020-02-20 DIAGNOSIS — Z30.432 ENCOUNTER FOR IUD REMOVAL: Primary | ICD-10-CM

## 2020-02-20 PROCEDURE — 58301 REMOVE INTRAUTERINE DEVICE: CPT | Performed by: OBSTETRICS & GYNECOLOGY

## 2020-02-20 ASSESSMENT — MIFFLIN-ST. JEOR: SCORE: 1695.15

## 2020-12-20 ENCOUNTER — HEALTH MAINTENANCE LETTER (OUTPATIENT)
Age: 35
End: 2020-12-20

## 2021-10-03 ENCOUNTER — HEALTH MAINTENANCE LETTER (OUTPATIENT)
Age: 36
End: 2021-10-03

## 2021-10-24 ASSESSMENT — ENCOUNTER SYMPTOMS
DIZZINESS: 0
EYE PAIN: 0
HEMATOCHEZIA: 0
HEARTBURN: 0
JOINT SWELLING: 0
NAUSEA: 0
SORE THROAT: 0
FEVER: 0
DIARRHEA: 0
SHORTNESS OF BREATH: 0
ABDOMINAL PAIN: 0
WEAKNESS: 0
PALPITATIONS: 0
FREQUENCY: 0
ARTHRALGIAS: 0
HEMATURIA: 0
NERVOUS/ANXIOUS: 0
BREAST MASS: 0
COUGH: 0
MYALGIAS: 0
HEADACHES: 0
CONSTIPATION: 0
CHILLS: 0
PARESTHESIAS: 0
DYSURIA: 0

## 2021-10-24 ASSESSMENT — ANXIETY QUESTIONNAIRES
6. BECOMING EASILY ANNOYED OR IRRITABLE: SEVERAL DAYS
GAD7 TOTAL SCORE: 4
7. FEELING AFRAID AS IF SOMETHING AWFUL MIGHT HAPPEN: SEVERAL DAYS
8. IF YOU CHECKED OFF ANY PROBLEMS, HOW DIFFICULT HAVE THESE MADE IT FOR YOU TO DO YOUR WORK, TAKE CARE OF THINGS AT HOME, OR GET ALONG WITH OTHER PEOPLE?: SOMEWHAT DIFFICULT
5. BEING SO RESTLESS THAT IT IS HARD TO SIT STILL: NOT AT ALL
7. FEELING AFRAID AS IF SOMETHING AWFUL MIGHT HAPPEN: SEVERAL DAYS
4. TROUBLE RELAXING: NOT AT ALL
3. WORRYING TOO MUCH ABOUT DIFFERENT THINGS: SEVERAL DAYS
2. NOT BEING ABLE TO STOP OR CONTROL WORRYING: NOT AT ALL
1. FEELING NERVOUS, ANXIOUS, OR ON EDGE: SEVERAL DAYS
GAD7 TOTAL SCORE: 4
GAD7 TOTAL SCORE: 4

## 2021-10-24 ASSESSMENT — PATIENT HEALTH QUESTIONNAIRE - PHQ9
SUM OF ALL RESPONSES TO PHQ QUESTIONS 1-9: 0
10. IF YOU CHECKED OFF ANY PROBLEMS, HOW DIFFICULT HAVE THESE PROBLEMS MADE IT FOR YOU TO DO YOUR WORK, TAKE CARE OF THINGS AT HOME, OR GET ALONG WITH OTHER PEOPLE: NOT DIFFICULT AT ALL
SUM OF ALL RESPONSES TO PHQ QUESTIONS 1-9: 0

## 2021-10-24 NOTE — PROGRESS NOTES
SUBJECTIVE:   CC: Maddy Anglin is an 36 year old woman who presents for preventive health visit.       Patient has been advised of split billing requirements and indicates understanding: Yes  Healthy Habits:     Getting at least 3 servings of Calcium per day:  Yes    Bi-annual eye exam:  Yes    Dental care twice a year:  NO    Sleep apnea or symptoms of sleep apnea:  None    Diet:  Regular (no restrictions)    Frequency of exercise:  4-5 days/week    Duration of exercise:  Greater than 60 minutes    Taking medications regularly:  Yes    Medication side effects:  None    PHQ-2 Total Score: 0    Additional concerns today:  Yes      -tenderness and pain in the left breast, 4-6 months, noticed whens he gets her period. She knows this can be normal but wants to make sure. No hx of cysts in breasts. About 2-3 days into her period the discomfort stops.  She is due around Saturday for her next menses.     Migraines: rare. Maybe 2-3 a year.     Depression: is good. Over the past 6 years doing really well. Not on medication now.     No alcohol for the past 2 weeks. Doing a Commit to Fit with her [employer]. She is feeling much better. She had been doing 3-5 drinks a day for past few years. She is now doing a protein shake for breakfast or lunch, improving diet.     Works from home, is in IT. Using exercise bike for an hour, then also [employer] for 45 min class 5 days a week.      Today's PHQ-2 Score:   PHQ-2 ( 1999 [employer]) 10/24/2021   Q1: Little interest or pleasure in doing things 0   Q2: Feeling down, depressed or hopeless 0   PHQ-2 Score 0   Q1: Little interest or pleasure in doing things Not at all   Q2: Feeling down, depressed or hopeless Not at all   PHQ-2 Score 0       Abuse: Current or Past (Physical, Sexual or Emotional) - No  Do you feel safe in your environment? Yes    Have you ever done Advance Care Planning? (For example, a Health Directive, POLST, or a discussion with a medical  provider or your loved ones about your wishes): No, advance care planning information given to patient to review.  Patient plans to discuss their wishes with loved ones or provider.      Social History     Tobacco Use     Smoking status: Former Smoker     Quit date: 2017     Years since quittin.5     Smokeless tobacco: Never Used   Substance Use Topics     Alcohol use: Not Currently     Comment: Previously drank on average 3-5 drinks per day.     If you drink alcohol do you typically have >3 drinks per day or >7 drinks per week? No    No flowsheet data found.    Reviewed orders with patient.  Reviewed health maintenance and updated orders accordingly - Yes  Lab work is in process    Breast Cancer Screening:  Any new diagnosis of family breast, ovarian, or bowel cancer? No    FHS-7: No flowsheet data found.  click delete button to remove this line now  Patient under 40 years of age: Routine Mammogram Screening not recommended.   Pertinent mammograms are reviewed under the imaging tab.    History of abnormal Pap smear: NO - age 30-65 PAP every 5 years with negative HPV co-testing recommended  PAP / HPV Latest Ref Rng & Units 10/18/2016 2013   PAP (Historical) - NIL NIL   HPV16 NEG Negative -   HPV18 NEG Negative -   HRHPV NEG Negative -     Reviewed and updated as needed this visit by clinical staff   Allergies  Meds  Problems  Med Hx  Surg Hx  Fam Hx          Reviewed and updated as needed this visit by Provider    Meds  Problems  Med Hx  Surg Hx  Fam Hx             Review of Systems   Constitutional: Negative for chills and fever.   HENT: Negative for congestion, ear pain, hearing loss and sore throat.    Eyes: Negative for pain and visual disturbance.   Respiratory: Negative for cough and shortness of breath.    Cardiovascular: Negative for chest pain, palpitations and peripheral edema.   Gastrointestinal: Negative for abdominal pain, constipation, diarrhea, heartburn, hematochezia and  "nausea.   Breasts:  Negative for tenderness, breast mass and discharge.   Genitourinary: Negative for dysuria, frequency, genital sores, hematuria, pelvic pain, urgency, vaginal bleeding and vaginal discharge.   Musculoskeletal: Negative for arthralgias, joint swelling and myalgias.   Skin: Negative for rash.   Neurological: Negative for dizziness, weakness, headaches and paresthesias.   Psychiatric/Behavioral: Negative for mood changes. The patient is not nervous/anxious.             OBJECTIVE:   /66   Pulse 76   Temp 97.1  F (36.2  C) (Tympanic)   Resp 20   Ht 1.715 m (5' 7.5\")   Wt 91.2 kg (201 lb)   LMP 10/01/2021   SpO2 98%   BMI 31.02 kg/m    Physical Exam  GENERAL: healthy, alert and no distress  EYES: Eyes grossly normal to inspection, PERRL and conjunctivae and sclerae normal  HENT: ear canals and TM's normal, nose and mouth without ulcers or lesions  NECK: no adenopathy, no asymmetry, masses, or scars and thyroid normal to palpation  RESP: lungs clear to auscultation - no rales, rhonchi or wheezes  BREAST: normal without masses, tenderness or nipple discharge and no palpable axillary masses or adenopathy  CV: regular rate and rhythm, normal S1 S2, no S3 or S4, no murmur, click or rub  ABDOMEN: soft, nontender, no hepatosplenomegaly, no masses and bowel sounds normal  MS: no gross musculoskeletal defects noted, no edema  SKIN: no suspicious lesions or rashes  NEURO: Normal strength and tone, mentation intact and speech normal  PSYCH: mentation appears normal, affect normal/bright    Diagnostic Test Results:  Labs reviewed in Epic  No results found for this or any previous visit (from the past 24 hour(s)).    ASSESSMENT/PLAN:       ICD-10-CM    1. Routine general medical examination at a health care facility  Z00.00 REVIEW OF HEALTH MAINTENANCE PROTOCOL ORDERS     Lipid panel reflex to direct LDL Fasting     Glucose   2. Migraine without aura and without status migrainosus, not intractable  " "G43.009 rizatriptan (MAXALT) 10 MG tablet   3. Major depressive disorder with single episode, in full remission (H)  F32.5    4. Screening for cervical cancer  Z12.4 Pap Screen with HPV - recommended age 30 - 65 years   5. Breast pain, left  N64.4 US Breast Left Complete 4 Quadrants   6. Alcohol abuse  F10.10        -Patient reports migraines are stable, has been quite infrequently.  Last prescription was from 2019, will send a new updated prescription today.  -Feels mood is stable has not done any medication at this time  -Patient reports drinking 3-5 drinks per night over the last couple of years.  She reported 2 weeks ago she stopped drinking, she signed up for her [employer] exercise routine.  She says she is feeling much better and working on her diet and nutrition.  -Patient having some left breast pain when she gets her menstrual cycle for the past 4 to 6 months.  Advised if she ends up getting the ultrasound there will be an additional charge for the physical today, and she does not then we will not charge extra.  She is supposed to get her period this weekend but does not have any pain at this time      Patient has been advised of split billing requirements and indicates understanding: Yes  COUNSELING:  Reviewed preventive health counseling, as reflected in patient instructions    Estimated body mass index is 31.02 kg/m  as calculated from the following:    Height as of this encounter: 1.715 m (5' 7.5\").    Weight as of this encounter: 91.2 kg (201 lb).    Weight management plan: Discussed healthy diet and exercise guidelines    She reports that she quit smoking about 4 years ago. She has never used smokeless tobacco.      Counseling Resources:  ATP IV Guidelines  Pooled Cohorts Equation Calculator  Breast Cancer Risk Calculator  BRCA-Related Cancer Risk Assessment: FHS-7 Tool  FRAX Risk Assessment  ICSI Preventive Guidelines  Dietary Guidelines for Americans, 2010  USDA's MyPlate  ASA Prophylaxis  Lung " CA Screening    TIM Pete, NP-C  [facility]  Answers for HPI/ROS submitted by the patient on 10/24/2021  If you checked off any problems, how difficult have these problems made it for you to do your work, take care of things at home, or get along with other people?: Not difficult at all  PHQ9 TOTAL SCORE: 0  SHARRI 7 TOTAL SCORE: 4

## 2021-10-24 NOTE — PATIENT INSTRUCTIONS
Ultrasound number: [phone]      Preventive Health Recommendations  Female Ages 26 - 39  Yearly exam:   See your health care provider every year in order to    Review health changes.     Discuss preventive care.      Review your medicines if you your doctor has prescribed any.    Until age 30: Get a Pap test every three years (more often if you have had an abnormal result).    After age 30: Talk to your doctor about whether you should have a Pap test every 3 years or have a Pap test with HPV screening every 5 years.   You do not need a Pap test if your uterus was removed (hysterectomy) and you have not had cancer.  You should be tested each year for STDs (sexually transmitted diseases), if you're at risk.   Talk to your provider about how often to have your cholesterol checked.  If you are at risk for diabetes, you should have a diabetes test (fasting glucose).  Shots: Get a flu shot each year. Get a tetanus shot every 10 years.   Nutrition:     Eat at least 5 servings of fruits and vegetables each day.    Eat whole-grain bread, whole-wheat pasta and brown rice instead of white grains and rice.    Get adequate Calcium and Vitamin D.     Lifestyle    Exercise at least 150 minutes a week (30 minutes a day, 5 days of the week). This will help you control your weight and prevent disease.    Limit alcohol to one drink per day.    No smoking.     Wear sunscreen to prevent skin cancer.    See your dentist every six months for an exam and cleaning.

## 2021-10-25 ASSESSMENT — ANXIETY QUESTIONNAIRES: GAD7 TOTAL SCORE: 4

## 2021-10-25 ASSESSMENT — PATIENT HEALTH QUESTIONNAIRE - PHQ9: SUM OF ALL RESPONSES TO PHQ QUESTIONS 1-9: 0

## 2021-10-27 ENCOUNTER — OFFICE VISIT (OUTPATIENT)
Dept: FAMILY MEDICINE | Facility: CLINIC | Age: 36
End: 2021-10-27
Payer: COMMERCIAL

## 2021-10-27 VITALS
HEART RATE: 76 BPM | OXYGEN SATURATION: 98 % | BODY MASS INDEX: 30.46 KG/M2 | WEIGHT: 201 LBS | DIASTOLIC BLOOD PRESSURE: 66 MMHG | TEMPERATURE: 97.1 F | SYSTOLIC BLOOD PRESSURE: 118 MMHG | RESPIRATION RATE: 20 BRPM | HEIGHT: 68 IN

## 2021-10-27 DIAGNOSIS — Z12.4 SCREENING FOR CERVICAL CANCER: ICD-10-CM

## 2021-10-27 DIAGNOSIS — Z00.00 ROUTINE GENERAL MEDICAL EXAMINATION AT A HEALTH CARE FACILITY: Primary | ICD-10-CM

## 2021-10-27 DIAGNOSIS — N64.4 BREAST PAIN, LEFT: ICD-10-CM

## 2021-10-27 DIAGNOSIS — F10.10 ALCOHOL ABUSE: ICD-10-CM

## 2021-10-27 DIAGNOSIS — F32.5 MAJOR DEPRESSIVE DISORDER WITH SINGLE EPISODE, IN FULL REMISSION (H): ICD-10-CM

## 2021-10-27 DIAGNOSIS — G43.009 MIGRAINE WITHOUT AURA AND WITHOUT STATUS MIGRAINOSUS, NOT INTRACTABLE: ICD-10-CM

## 2021-10-27 PROCEDURE — 87624 HPV HI-RISK TYP POOLED RSLT: CPT | Performed by: NURSE PRACTITIONER

## 2021-10-27 PROCEDURE — 99395 PREV VISIT EST AGE 18-39: CPT | Performed by: NURSE PRACTITIONER

## 2021-10-27 PROCEDURE — G0145 SCR C/V CYTO,THINLAYER,RESCR: HCPCS | Performed by: NURSE PRACTITIONER

## 2021-10-27 RX ORDER — RIZATRIPTAN BENZOATE 10 MG/1
10 TABLET ORAL
Qty: 20 TABLET | Refills: 1 | Status: SHIPPED | OUTPATIENT
Start: 2021-10-27

## 2021-10-27 ASSESSMENT — ENCOUNTER SYMPTOMS
CHILLS: 0
FREQUENCY: 0
HEADACHES: 0
SHORTNESS OF BREATH: 0
MYALGIAS: 0
HEARTBURN: 0
ABDOMINAL PAIN: 0
COUGH: 0
PARESTHESIAS: 0
DYSURIA: 0
PALPITATIONS: 0
NERVOUS/ANXIOUS: 0
FEVER: 0
CONSTIPATION: 0
DIZZINESS: 0
WEAKNESS: 0
HEMATURIA: 0
JOINT SWELLING: 0
EYE PAIN: 0
BREAST MASS: 0
DIARRHEA: 0
SORE THROAT: 0
NAUSEA: 0
HEMATOCHEZIA: 0
ARTHRALGIAS: 0

## 2021-10-27 ASSESSMENT — MIFFLIN-ST. JEOR: SCORE: 1637.29

## 2021-10-29 LAB
BKR LAB AP GYN ADEQUACY: NORMAL
BKR LAB AP GYN INTERPRETATION: NORMAL
BKR LAB AP HPV REFLEX: NORMAL
BKR LAB AP LMP: NORMAL
BKR LAB AP PREVIOUS ABNORMAL: NORMAL
PATH REPORT.COMMENTS IMP SPEC: NORMAL
PATH REPORT.RELEVANT HX SPEC: NORMAL

## 2021-11-02 LAB
HUMAN PAPILLOMA VIRUS 16 DNA: NEGATIVE
HUMAN PAPILLOMA VIRUS 18 DNA: NEGATIVE
HUMAN PAPILLOMA VIRUS FINAL DIAGNOSIS: NORMAL
HUMAN PAPILLOMA VIRUS OTHER HR: NEGATIVE

## 2022-01-17 ENCOUNTER — LAB (OUTPATIENT)
Dept: URGENT CARE | Facility: URGENT CARE | Age: 37
End: 2022-01-17
Attending: FAMILY MEDICINE
Payer: COMMERCIAL

## 2022-01-17 DIAGNOSIS — Z20.822 CLOSE EXPOSURE TO 2019 NOVEL CORONAVIRUS: ICD-10-CM

## 2022-01-17 PROCEDURE — 99000 SPECIMEN HANDLING OFFICE-LAB: CPT

## 2022-01-17 PROCEDURE — U0003 INFECTIOUS AGENT DETECTION BY NUCLEIC ACID (DNA OR RNA); SEVERE ACUTE RESPIRATORY SYNDROME CORONAVIRUS 2 (SARS-COV-2) (CORONAVIRUS DISEASE [COVID-19]), AMPLIFIED PROBE TECHNIQUE, MAKING USE OF HIGH THROUGHPUT TECHNOLOGIES AS DESCRIBED BY CMS-2020-01-R: HCPCS | Mod: 90

## 2022-01-17 PROCEDURE — U0005 INFEC AGEN DETEC AMPLI PROBE: HCPCS | Mod: 90

## 2022-01-18 LAB — SARS-COV-2 RNA RESP QL NAA+PROBE: NOT DETECTED

## 2022-02-15 ENCOUNTER — LAB (OUTPATIENT)
Dept: LAB | Facility: CLINIC | Age: 37
End: 2022-02-15
Payer: COMMERCIAL

## 2022-02-15 DIAGNOSIS — Z00.00 ROUTINE GENERAL MEDICAL EXAMINATION AT A HEALTH CARE FACILITY: ICD-10-CM

## 2022-02-15 DIAGNOSIS — Z11.59 NEED FOR HEPATITIS C SCREENING TEST: ICD-10-CM

## 2022-02-15 LAB
CHOLEST SERPL-MCNC: 243 MG/DL
FASTING STATUS PATIENT QL REPORTED: YES
FASTING STATUS PATIENT QL REPORTED: YES
GLUCOSE BLD-MCNC: 114 MG/DL (ref 70–99)
HCV AB SERPL QL IA: NONREACTIVE
HDLC SERPL-MCNC: 37 MG/DL
LDLC SERPL CALC-MCNC: 97 MG/DL
LDLC SERPL CALC-MCNC: ABNORMAL MG/DL
NONHDLC SERPL-MCNC: 206 MG/DL
TRIGL SERPL-MCNC: 628 MG/DL

## 2022-02-15 PROCEDURE — 83721 ASSAY OF BLOOD LIPOPROTEIN: CPT | Mod: 59

## 2022-02-15 PROCEDURE — 80061 LIPID PANEL: CPT

## 2022-02-15 PROCEDURE — 86803 HEPATITIS C AB TEST: CPT

## 2022-02-15 PROCEDURE — 36415 COLL VENOUS BLD VENIPUNCTURE: CPT

## 2022-02-15 PROCEDURE — 82947 ASSAY GLUCOSE BLOOD QUANT: CPT

## 2022-02-16 NOTE — RESULT ENCOUNTER NOTE
Maddy,    - The hepatitis C screening test was negative.     - The fasting blood glucose is elevated in the pre-diabetic range (100-125). This puts you at risk for developing diabetes in the future.  Lifestyle measures will help to lower your blood glucose levels and lower the risks of diabetes. These measures include regular exercise, weight loss/maintaining a healthy body weight, and moderating/decreasing carbohydrates (sugar, bread, pasta, rice, baked goods, potatoes, etc) in your diet. We will continue to monitor your blood glucose annually, but please be seen sooner  if you develop any new signs or symptoms of diabetes (increase thirst or urination, fatigue, blurry vision, unexplained weight loss).      - Your cholesterol panel shows the triglycerides are severely elevated. I suspect this could be due in part to your alcohol use. It is very important to treat the high triglycerides to help lower heart diease risk and the risk of pancreatitis when the level is this high. Pattie Rivera NP is out of the office right now so I would encourage you to follow-up with one of us, her colleagues, right now to review the options for treatment of the triglycerides. A virtual phone or video visit would work well for this. Please try to schedule this in the next week or so.     Please MyChart or call if you have any concerns or questions.   Sincerely,  Olive Lawrence MD

## 2022-09-10 ENCOUNTER — HEALTH MAINTENANCE LETTER (OUTPATIENT)
Age: 37
End: 2022-09-10

## 2022-11-27 ENCOUNTER — E-VISIT (OUTPATIENT)
Dept: URGENT CARE | Facility: CLINIC | Age: 37
End: 2022-11-27
Payer: COMMERCIAL

## 2022-11-27 DIAGNOSIS — Z20.818 EXPOSURE TO STREP THROAT: ICD-10-CM

## 2022-11-27 DIAGNOSIS — R07.0 THROAT PAIN: Primary | ICD-10-CM

## 2022-11-27 PROCEDURE — 99421 OL DIG E/M SVC 5-10 MIN: CPT | Performed by: NURSE PRACTITIONER

## 2022-11-27 NOTE — PATIENT INSTRUCTIONS
Dear Maddy Anglin    I have ordered a strep test for you. If positive, we will treat it     Thanks for choosing us as your health care partner,    TIM Tracy CNP

## 2023-01-21 ENCOUNTER — HEALTH MAINTENANCE LETTER (OUTPATIENT)
Age: 38
End: 2023-01-21

## 2023-02-10 ENCOUNTER — TELEPHONE (OUTPATIENT)
Dept: FAMILY MEDICINE | Facility: CLINIC | Age: 38
End: 2023-02-10

## 2023-02-10 NOTE — TELEPHONE ENCOUNTER
Attempted to call patient's 5/1/2023 appointment with Pattie corona. Call was attempted on 2/10/2023, patient did not answer phone and voicemail was full.

## 2024-01-05 NOTE — PROGRESS NOTES
"  Assessment & Plan     Bilateral impacted cerumen  Cleared with water irrigation BL ears without complication. No residual wax and no signs of irritation infection or rupture of TM.  Discussed therapy should pain begin       BMI:   Estimated body mass index is 31.78 kg/m  as calculated from the following:    Height as of this encounter: 1.727 m (5' 8\").    Weight as of this encounter: 94.8 kg (209 lb).   Weight management plan: defer today    Follow up as needed     JONE SIMMONS PA-C  [facility]    Fadumo Castañeda is a 38 year old, presenting for the following health issues:  Ear Problem (/)        1/12/2024     9:08 AM   Additional Questions   Roomed by Gisela Higgins MA   Accompanied by Self       History of Present Illness       Reason for visit:  Left ear has been plugged and muffled with mild pain since 12/29  Symptom onset:  1-2 weeks ago  Symptoms include:  Plugged, muffled ear with mild pain  Symptom intensity:  Moderate  Symptom progression:  Staying the same  Had these symptoms before:  Yes  Has tried/received treatment for these symptoms:  Yes  Previous treatment was successful:  No  What makes it worse:  Messing with my ear to try to get it to clear causes it to hurt  What makes it better:  No - have tried hot compress, showers/steam, debrox ear drops, swimmers ear drops, nothing clears the ear    She eats 2-3 servings of fruits and vegetables daily.She consumes 2 sweetened beverage(s) daily.She exercises with enough effort to increase her heart rate 20 to 29 minutes per day.  She exercises with enough effort to increase her heart rate 4 days per week.   She is taking medications regularly.         Review of Systems   Constitutional, HEENT, cardiovascular, pulmonary, gi and gu systems are negative, except as otherwise noted.      Objective    /85   Pulse 93   Temp 97.8  F (36.6  C) (Tympanic)   Resp 16   Ht 1.727 m (5' 8\")   Wt 94.8 kg (209 lb)   SpO2 99%   " Breastfeeding No   BMI 31.78 kg/m    Body mass index is 31.78 kg/m .  Physical Exam   GENERAL: healthy, alert and no distress  EYES: Eyes grossly normal to inspection, PERRL and conjunctivae and sclerae normal  HENT: ear canals and TM's normal, nose and mouth without ulcers or lesions

## 2024-01-12 ENCOUNTER — OFFICE VISIT (OUTPATIENT)
Dept: FAMILY MEDICINE | Facility: CLINIC | Age: 39
End: 2024-01-12
Payer: COMMERCIAL

## 2024-01-12 VITALS
SYSTOLIC BLOOD PRESSURE: 137 MMHG | DIASTOLIC BLOOD PRESSURE: 85 MMHG | RESPIRATION RATE: 16 BRPM | OXYGEN SATURATION: 99 % | HEIGHT: 68 IN | TEMPERATURE: 97.8 F | WEIGHT: 209 LBS | HEART RATE: 93 BPM | BODY MASS INDEX: 31.67 KG/M2

## 2024-01-12 DIAGNOSIS — H61.23 BILATERAL IMPACTED CERUMEN: Primary | ICD-10-CM

## 2024-01-12 PROCEDURE — 69209 REMOVE IMPACTED EAR WAX UNI: CPT | Mod: 50 | Performed by: PHYSICIAN ASSISTANT

## 2024-01-12 ASSESSMENT — PATIENT HEALTH QUESTIONNAIRE - PHQ9
10. IF YOU CHECKED OFF ANY PROBLEMS, HOW DIFFICULT HAVE THESE PROBLEMS MADE IT FOR YOU TO DO YOUR WORK, TAKE CARE OF THINGS AT HOME, OR GET ALONG WITH OTHER PEOPLE: NOT DIFFICULT AT ALL
SUM OF ALL RESPONSES TO PHQ QUESTIONS 1-9: 1
SUM OF ALL RESPONSES TO PHQ QUESTIONS 1-9: 1

## 2024-01-12 ASSESSMENT — PAIN SCALES - GENERAL: PAINLEVEL: MILD PAIN (2)

## 2024-02-18 ENCOUNTER — HEALTH MAINTENANCE LETTER (OUTPATIENT)
Age: 39
End: 2024-02-18

## 2024-04-05 NOTE — ANESTHESIA PREPROCEDURE EVALUATION
Anesthesia Evaluation       history and physical reviewed .      No history of anesthetic complications          ROS/MED HX    ENT/Pulmonary:       Neurologic:       Cardiovascular:         METS/Exercise Tolerance:     Hematologic:         Musculoskeletal:         GI/Hepatic:         Renal/Genitourinary:         Endo:         Psychiatric:         Infectious Disease:         Malignancy:         Other:                                    Anesthesia Plan      History & Physical Review      ASA Status:  .  OB Epidural Asa: 2            Postoperative Care      Consents  Anesthetic plan, risks, benefits and alternatives discussed with:  Patient..                          .   Addended by: ANAYELI ALBA on: 4/5/2024 04:17 PM     Modules accepted: Orders

## 2024-09-15 ENCOUNTER — HEALTH MAINTENANCE LETTER (OUTPATIENT)
Age: 39
End: 2024-09-15

## 2024-12-17 ENCOUNTER — OFFICE VISIT (OUTPATIENT)
Dept: URGENT CARE | Facility: URGENT CARE | Age: 39
End: 2024-12-17
Payer: COMMERCIAL

## 2024-12-17 VITALS
HEART RATE: 135 BPM | WEIGHT: 206 LBS | TEMPERATURE: 97.5 F | DIASTOLIC BLOOD PRESSURE: 126 MMHG | SYSTOLIC BLOOD PRESSURE: 185 MMHG | RESPIRATION RATE: 18 BRPM | OXYGEN SATURATION: 99 % | BODY MASS INDEX: 31.32 KG/M2

## 2024-12-17 DIAGNOSIS — L72.9 INFECTED CYST OF SKIN: Primary | ICD-10-CM

## 2024-12-17 DIAGNOSIS — L08.9 INFECTED CYST OF SKIN: Primary | ICD-10-CM

## 2024-12-17 PROCEDURE — 99213 OFFICE O/P EST LOW 20 MIN: CPT | Performed by: STUDENT IN AN ORGANIZED HEALTH CARE EDUCATION/TRAINING PROGRAM

## 2024-12-17 PROCEDURE — 87070 CULTURE OTHR SPECIMN AEROBIC: CPT | Performed by: STUDENT IN AN ORGANIZED HEALTH CARE EDUCATION/TRAINING PROGRAM

## 2024-12-17 PROCEDURE — 87205 SMEAR GRAM STAIN: CPT | Performed by: STUDENT IN AN ORGANIZED HEALTH CARE EDUCATION/TRAINING PROGRAM

## 2024-12-17 RX ORDER — DOXYCYCLINE HYCLATE 100 MG
100 TABLET ORAL 2 TIMES DAILY
Qty: 14 TABLET | Refills: 0 | Status: SHIPPED | OUTPATIENT
Start: 2024-12-17 | End: 2024-12-24

## 2024-12-17 NOTE — PATIENT INSTRUCTIONS
Great to meet you in clinic!    For infected cyst on abdomen:  - Drained pus today and collected culture  - Start doxycycline for treatment, if the culture shows this is the wrong antibiotic, we will call you and send in a new prescription  - Ultrasound of cyst ordered, if it appears to be pus filled (sebaceous cyst) we will send you to dermatology, if solid/fat (lipoma) we will send you to general surgery  - If worsening redness, increasing pus, or if fevers or feeling more ill this could suggest worsening infection and you should be seen again

## 2024-12-17 NOTE — PROGRESS NOTES
Assessment & Plan     Infected cyst of skin  Palpable small cyst for approximately 6 months that was not changing. Then yesterday became red and inflamed with pus drainage. No fevers or systemic symptoms.  Approximately 5 mL of expressed drainage in clinic, swab sent for culture, will cover for MRSA with doxycycline.  Still has palpable 1 to 2 cm solid cystlike mass, sent for ultrasound, if appears like sebaceous cyst would just send to dermatology if any other or unclear type of cyst would send to general surgery consider removal since it has been infected and could become again.  - Cyst Aerobic Bacterial Culture Routine With Gram Stain  - doxycycline hyclate (VIBRA-TABS) 100 MG tablet  Dispense: 14 tablet; Refill: 0  - US Soft Tissue Abdominal Wall or Lower Back   - If sebaceous cyst refer to dermatology   - If lipoma or other unknown cyst refer to general surgery to consider removal  -Return precautions discussed     Tachycardia  High BP  Patient has anxiety with medical offices. Smart watched showed that HR was 90 earlier. Acknowledged that felt very anxious during initial and repeat vitals. BP improved on recheck, still similarly tachycardic. Very likely anxiety, with smart watched advised patient that resting HR > 100 later today when calm is abnormal and would require being seen again.               No follow-ups on file.    Salas Ayon MD  [facility]    Fadumo Castañeda is a 39 year old female who presents to clinic today for the following health issues:  Chief Complaint   Patient presents with    Cyst     Onset: yesterday: Pt has a cyst on the abdomen, the pt reports some fluid drainage, pain, edema,      Small bump for last several months, yesterday became painful and started having some drainage. No N/V. No fevers, did have 99.8F yesterday, that resolved, no true fever.  Otherwise doing fine, no other symptoms.    Has appointment with primary  upcoming.            Review of Systems        Objective    BP (!) 185/126 (BP Location: Left arm, Patient Position: Sitting, Cuff Size: Adult Regular)   Pulse (!) 135   Temp 97.5  F (36.4  C) (Axillary)   Resp 18   Wt 93.4 kg (206 lb)   SpO2 99%   BMI 31.32 kg/m    Physical Exam   GENERAL: alert and no distress, anxious  RESP: lungs clear to auscultation - no rales, rhonchi or wheezes  CV: regular rate and rhythm, normal S1 S2, no S3 or S4, no murmur, click or rub, no peripheral edema  ABDOMEN: soft, nontender, no hepatosplenomegaly, no masses  MS: no gross musculoskeletal defects noted, no edema  SKIN: To right of umbilicus 1 to 2 cm palpable mass with pinpoint head, 3 inches of red erythematous skin around it, significant pus with mixed blood expressed from mass, mass has some tenderness to palpation when expressing pus, after contents expressed still with palpable mass  NEURO: Normal strength and tone, mentation intact and speech normal

## 2024-12-19 LAB
BACTERIA FLD CULT: ABNORMAL
GRAM STAIN RESULT: ABNORMAL
GRAM STAIN RESULT: ABNORMAL

## 2024-12-24 LAB
BACTERIA FLD CULT: ABNORMAL
GRAM STAIN RESULT: ABNORMAL
GRAM STAIN RESULT: ABNORMAL

## 2025-01-06 ENCOUNTER — ANCILLARY PROCEDURE (OUTPATIENT)
Dept: ULTRASOUND IMAGING | Facility: CLINIC | Age: 40
End: 2025-01-06
Attending: STUDENT IN AN ORGANIZED HEALTH CARE EDUCATION/TRAINING PROGRAM
Payer: COMMERCIAL

## 2025-01-06 DIAGNOSIS — L08.9 INFECTED CYST OF SKIN: ICD-10-CM

## 2025-01-06 DIAGNOSIS — L72.9 INFECTED CYST OF SKIN: ICD-10-CM

## 2025-01-06 PROCEDURE — 76705 ECHO EXAM OF ABDOMEN: CPT | Performed by: STUDENT IN AN ORGANIZED HEALTH CARE EDUCATION/TRAINING PROGRAM

## 2025-07-10 ENCOUNTER — PATIENT OUTREACH (OUTPATIENT)
Dept: CARE COORDINATION | Facility: CLINIC | Age: 40
End: 2025-07-10
Payer: COMMERCIAL